# Patient Record
Sex: FEMALE | Race: BLACK OR AFRICAN AMERICAN | NOT HISPANIC OR LATINO | Employment: FULL TIME | URBAN - METROPOLITAN AREA
[De-identification: names, ages, dates, MRNs, and addresses within clinical notes are randomized per-mention and may not be internally consistent; named-entity substitution may affect disease eponyms.]

---

## 2020-01-04 PROBLEM — Z34.80 SUPERVISION OF OTHER NORMAL PREGNANCY: Status: ACTIVE | Noted: 2020-01-04

## 2020-01-04 PROBLEM — Z01.419 WELL WOMAN EXAM: Status: ACTIVE | Noted: 2020-01-04

## 2020-01-08 ENCOUNTER — APPOINTMENT (OUTPATIENT)
Dept: LAB | Facility: HOSPITAL | Age: 30
End: 2020-01-08

## 2020-01-08 ENCOUNTER — INITIAL PRENATAL (OUTPATIENT)
Dept: OBSTETRICS AND GYNECOLOGY | Facility: CLINIC | Age: 30
End: 2020-01-08

## 2020-01-08 VITALS
DIASTOLIC BLOOD PRESSURE: 68 MMHG | BODY MASS INDEX: 20.88 KG/M2 | SYSTOLIC BLOOD PRESSURE: 108 MMHG | WEIGHT: 133 LBS | HEIGHT: 67 IN

## 2020-01-08 DIAGNOSIS — Z34.80 SUPERVISION OF OTHER NORMAL PREGNANCY: Primary | ICD-10-CM

## 2020-01-08 PROBLEM — Z86.19 H/O HERPES GENITALIS: Status: ACTIVE | Noted: 2018-01-01

## 2020-01-08 LAB
2ND TRIMESTER 4 SCREEN SERPL-IMP: NORMAL
ABO GROUP BLD: NORMAL
AMPHET+METHAMPHET UR QL: NEGATIVE
AMPHETAMINES UR QL: NEGATIVE
BARBITURATES UR QL SCN: NEGATIVE
BASOPHILS # BLD AUTO: 0.05 10*3/MM3 (ref 0–0.2)
BASOPHILS NFR BLD AUTO: 0.3 % (ref 0–1.5)
BENZODIAZ UR QL SCN: NEGATIVE
BLD GP AB SCN SERPL QL: NEGATIVE
BUPRENORPHINE SERPL-MCNC: NEGATIVE NG/ML
CANNABINOIDS SERPL QL: NEGATIVE
COCAINE UR QL: NEGATIVE
DEPRECATED RDW RBC AUTO: 42.6 FL (ref 37–54)
EOSINOPHIL # BLD AUTO: 0.33 10*3/MM3 (ref 0–0.4)
EOSINOPHIL NFR BLD AUTO: 2.2 % (ref 0.3–6.2)
ERYTHROCYTE [DISTWIDTH] IN BLOOD BY AUTOMATED COUNT: 12.8 % (ref 12.3–15.4)
EXTERNAL NIPT: NORMAL
HBV SURFACE AG SERPL QL IA: NORMAL
HCT VFR BLD AUTO: 40.2 % (ref 34–46.6)
HCV AB SER DONR QL: NORMAL
HGB BLD-MCNC: 13.7 G/DL (ref 12–15.9)
HIV1+2 AB SER QL: NORMAL
IMM GRANULOCYTES # BLD AUTO: 0.08 10*3/MM3 (ref 0–0.05)
IMM GRANULOCYTES NFR BLD AUTO: 0.5 % (ref 0–0.5)
LYMPHOCYTES # BLD AUTO: 2.58 10*3/MM3 (ref 0.7–3.1)
LYMPHOCYTES NFR BLD AUTO: 17 % (ref 19.6–45.3)
MCH RBC QN AUTO: 30.6 PG (ref 26.6–33)
MCHC RBC AUTO-ENTMCNC: 34.1 G/DL (ref 31.5–35.7)
MCV RBC AUTO: 89.9 FL (ref 79–97)
METHADONE UR QL SCN: NEGATIVE
MONOCYTES # BLD AUTO: 0.68 10*3/MM3 (ref 0.1–0.9)
MONOCYTES NFR BLD AUTO: 4.5 % (ref 5–12)
NEUTROPHILS # BLD AUTO: 11.48 10*3/MM3 (ref 1.7–7)
NEUTROPHILS NFR BLD AUTO: 75.5 % (ref 42.7–76)
NRBC BLD AUTO-RTO: 0 /100 WBC (ref 0–0.2)
OPIATES UR QL: NEGATIVE
OXYCODONE UR QL SCN: NEGATIVE
PCP UR QL SCN: NEGATIVE
PLATELET # BLD AUTO: 285 10*3/MM3 (ref 140–450)
PMV BLD AUTO: 9.3 FL (ref 6–12)
PROPOXYPH UR QL: NEGATIVE
RBC # BLD AUTO: 4.47 10*6/MM3 (ref 3.77–5.28)
RH BLD: POSITIVE
TRICYCLICS UR QL SCN: NEGATIVE
WBC NRBC COR # BLD: 15.2 10*3/MM3 (ref 3.4–10.8)

## 2020-01-08 PROCEDURE — 86850 RBC ANTIBODY SCREEN: CPT | Performed by: OBSTETRICS & GYNECOLOGY

## 2020-01-08 PROCEDURE — 0501F PRENATAL FLOW SHEET: CPT | Performed by: OBSTETRICS & GYNECOLOGY

## 2020-01-08 PROCEDURE — 87591 N.GONORRHOEAE DNA AMP PROB: CPT | Performed by: OBSTETRICS & GYNECOLOGY

## 2020-01-08 PROCEDURE — 80081 OBSTETRIC PANEL INC HIV TSTG: CPT | Performed by: OBSTETRICS & GYNECOLOGY

## 2020-01-08 PROCEDURE — 86900 BLOOD TYPING SEROLOGIC ABO: CPT | Performed by: OBSTETRICS & GYNECOLOGY

## 2020-01-08 PROCEDURE — 87086 URINE CULTURE/COLONY COUNT: CPT | Performed by: OBSTETRICS & GYNECOLOGY

## 2020-01-08 PROCEDURE — 86803 HEPATITIS C AB TEST: CPT | Performed by: OBSTETRICS & GYNECOLOGY

## 2020-01-08 PROCEDURE — 87491 CHLMYD TRACH DNA AMP PROBE: CPT | Performed by: OBSTETRICS & GYNECOLOGY

## 2020-01-08 PROCEDURE — G0432 EIA HIV-1/HIV-2 SCREEN: HCPCS | Performed by: OBSTETRICS & GYNECOLOGY

## 2020-01-08 PROCEDURE — 36415 COLL VENOUS BLD VENIPUNCTURE: CPT | Performed by: OBSTETRICS & GYNECOLOGY

## 2020-01-08 PROCEDURE — 80306 DRUG TEST PRSMV INSTRMNT: CPT | Performed by: OBSTETRICS & GYNECOLOGY

## 2020-01-08 PROCEDURE — 87340 HEPATITIS B SURFACE AG IA: CPT | Performed by: OBSTETRICS & GYNECOLOGY

## 2020-01-08 PROCEDURE — 86901 BLOOD TYPING SEROLOGIC RH(D): CPT | Performed by: OBSTETRICS & GYNECOLOGY

## 2020-01-08 PROCEDURE — 85025 COMPLETE CBC W/AUTO DIFF WBC: CPT | Performed by: OBSTETRICS & GYNECOLOGY

## 2020-01-08 PROCEDURE — 86747 PARVOVIRUS ANTIBODY: CPT | Performed by: OBSTETRICS & GYNECOLOGY

## 2020-01-08 RX ORDER — PRENATAL VIT/IRON FUM/FOLIC AC 27MG-0.8MG
TABLET ORAL DAILY
COMMUNITY

## 2020-01-08 NOTE — PROGRESS NOTES
Subjective   Chief Complaint   Patient presents with   • Initial Prenatal Visit       Lucinda Nieto is a 29 y.o. year old .  Patient's last menstrual period was 10/19/2019 (approximate).  She presents to be seen to initiate prenatal care.     Social History    Tobacco Use      Smoking status: Never Smoker      Smokeless tobacco: Never Used      The following portions of the patient's history were reviewed and updated as appropriate:vital signs, allergies, current medications, past medical history, past social history, past surgical history and problem list.    Objective   Lab Review   No data reviewed    Imaging   No data reviewed    Assessment/Plan   ASSESSMENT  1. 29 y.o. year old  at 11w4d  2. Supervision of low risk pregnancy   3. H/o HSV    PLAN  1. The problem list for pregnancy was initiated today  2. Tests ordered today:  Orders Placed This Encounter   Procedures   • Urine Culture - Urine, Urine, Clean Catch   • Chlamydia trachomatis, Neisseria gonorrhoeae, PCR w/ confirmation - Urine, Urine, Clean Catch   • Obstetric Panel   • HIV-1 / O / 2 Ag / Antibody 4th Generation   • Urine Drug Screen - Urine, Clean Catch     Please confirm all positive UDS   • Parvovirus B19 Antibody, IgG     3. Testing for GC / Chlamydia / trichomonas was done today  4. Genetic testing reviewed: MSAFP-4 and NIPT (Panorama)  5. Herpes suppression at 36 weeks explained  6. Information reviewed: exercise in pregnancy, nutrition in pregnancy, weight gain in pregnancy, work and travel restrictions during pregnancy, list of OTC medications acceptable in pregnancy and call coverage groups    Total time spent today with Lucinda  was 50 minutes (level 4).  Off this time, 80% was spent face-to-face time coordinating care, answering her questions and counseling regarding exercise in pregnancy, nutrition in pregnancy, weight gain in pregnancy, work and travel restrictions during pregnancy, list of OTC medications acceptable in  pregnancy and call coverage groups.    Follow up: 1 week(s)       This note was electronically signed.    Martin Haile MD  January 8, 2020

## 2020-01-09 LAB — RPR SER QL: NORMAL

## 2020-01-10 LAB
B19V IGG SER IA-ACNC: 4.9 INDEX (ref 0–0.8)
BACTERIA SPEC AEROBE CULT: NO GROWTH
RUBV IGG SERPL IA-ACNC: POSITIVE

## 2020-01-13 ENCOUNTER — TELEPHONE (OUTPATIENT)
Dept: OBSTETRICS AND GYNECOLOGY | Facility: CLINIC | Age: 30
End: 2020-01-13

## 2020-01-13 LAB
C TRACH RRNA SPEC DONR QL NAA+PROBE: NEGATIVE
N GONORRHOEA DNA SPEC QL NAA+PROBE: NEGATIVE

## 2020-01-13 NOTE — TELEPHONE ENCOUNTER
Called pt to let her know that per Dr Haile her lab test results showed she previously was exposed to parvovirus.  As such if a child at work comes in with Fifths disease, she is protected and does not have to worry. Pt did not answer so left a message for her to return our call.

## 2020-02-10 ENCOUNTER — ROUTINE PRENATAL (OUTPATIENT)
Dept: OBSTETRICS AND GYNECOLOGY | Facility: CLINIC | Age: 30
End: 2020-02-10

## 2020-02-10 VITALS — BODY MASS INDEX: 24.12 KG/M2 | DIASTOLIC BLOOD PRESSURE: 64 MMHG | WEIGHT: 154 LBS | SYSTOLIC BLOOD PRESSURE: 110 MMHG

## 2020-02-10 DIAGNOSIS — Z12.4 CERVICAL CANCER SCREENING: ICD-10-CM

## 2020-02-10 DIAGNOSIS — Z34.80 SUPERVISION OF OTHER NORMAL PREGNANCY: Primary | ICD-10-CM

## 2020-02-10 PROCEDURE — 0502F SUBSEQUENT PRENATAL CARE: CPT | Performed by: OBSTETRICS & GYNECOLOGY

## 2020-02-10 NOTE — PROGRESS NOTES
Chief Complaint   Patient presents with   • Routine Prenatal Visit       HPI: Lucinda is a  currently at 16w2d who today reports the following:  Nausea - No; Vaginal bleeding -  No; Heartburn - No.    ROS:  GI: Constipation - No; Diarrhea - No    Neuro: Headache - No; Visual change - No      EXAM:  Vitals: See prenatal flowsheet   Abdomen: See prenatal flowsheet   Urine glucose/protein: See prenatal flowsheet   Pelvic: See prenatal flowsheet     Prenatal Labs  Lab Results   Component Value Date    HGB 13.7 2020    RUBELLAABIGG Positive 2020    HEPBSAG Non-Reactive 2020    ABSCRN Negative 2020    EHL9ADI1 Non-Reactive 2020    HEPCVIRUSABY Non-Reactive 2020    URINECX No growth 2020    CHLAMNAA Negative 2020    NGONORRHON Negative 2020       MDM:  Impression: 1. Supervision of low risk pregnancy   Tests done today: 1. PAP   Topics discussed: 1. Continue with PNV's  2. Prenatal labs reviewed  3. flu vaccine   Tests scheduled today for her next visit:   U/S for anatomic screening

## 2020-02-18 ENCOUNTER — TELEPHONE (OUTPATIENT)
Dept: OBSTETRICS AND GYNECOLOGY | Facility: CLINIC | Age: 30
End: 2020-02-18

## 2020-02-18 NOTE — TELEPHONE ENCOUNTER
Dr Haile pt    Pt is calling to say she wants to have the genetic testing that was discussed at her first prenatal visit.

## 2020-02-18 NOTE — TELEPHONE ENCOUNTER
Patient returning call but does not remember the different types of genetic testing so need to discuss.

## 2020-02-18 NOTE — TELEPHONE ENCOUNTER
She does need to swing by the office to  a lab slip.  We talked about a couple different test.  I assume she is talking about and panorama (NIPT) and not the MSAFP-4.  I think we talked about both tests.  Confirm which test she wants done up with the appropriate ordering.  Thank you.

## 2020-03-09 ENCOUNTER — ROUTINE PRENATAL (OUTPATIENT)
Dept: OBSTETRICS AND GYNECOLOGY | Facility: CLINIC | Age: 30
End: 2020-03-09

## 2020-03-09 ENCOUNTER — LAB REQUISITION (OUTPATIENT)
Dept: LAB | Facility: HOSPITAL | Age: 30
End: 2020-03-09

## 2020-03-09 VITALS — SYSTOLIC BLOOD PRESSURE: 108 MMHG | WEIGHT: 160 LBS | DIASTOLIC BLOOD PRESSURE: 64 MMHG | BODY MASS INDEX: 25.06 KG/M2

## 2020-03-09 DIAGNOSIS — Z34.80 SUPERVISION OF OTHER NORMAL PREGNANCY: Primary | ICD-10-CM

## 2020-03-09 DIAGNOSIS — O28.3 ECHOGENIC INTRACARDIAC FOCUS OF FETUS ON PRENATAL ULTRASOUND: ICD-10-CM

## 2020-03-09 DIAGNOSIS — Z00.00 ROUTINE GENERAL MEDICAL EXAMINATION AT A HEALTH CARE FACILITY: ICD-10-CM

## 2020-03-09 PROCEDURE — 0502F SUBSEQUENT PRENATAL CARE: CPT | Performed by: OBSTETRICS & GYNECOLOGY

## 2020-03-09 PROCEDURE — 36415 COLL VENOUS BLD VENIPUNCTURE: CPT

## 2020-03-09 NOTE — PROGRESS NOTES
Chief Complaint   Patient presents with   • Routine Prenatal Visit       HPI: Lucinda is a  currently at 20w2d who today reports the following:  Contractions - No; Leaking - No; Vaginal bleeding -  No; Heartburn - No.    ROS:  GI: Nausea - No ; Constipation - No; Diarrhea - No    Neuro: Headache - No ; Visual change - No      EXAM:  Vitals: See prenatal flowsheet   Abdomen: See prenatal flowsheet   Urine glucose/protein: See prenatal flowsheet   Pelvic: See prenatal flowsheet     Lab Results   Component Value Date    ABO A 2020    RH Positive 2020    ABSCRN Negative 2020       MDM:  Impression: 1. Supervision of low risk pregnancy  2. lveif   Tests done today: 1. U/S for anatomic screening - anatomy completely seen today   Topics discussed: 1. Continue with PNV's  2. Prenatal labs reviewed  3. I explained to uLcinda that an LVEIF is considered a soft marker for Down's syndrome.  As an isolated finding, it may at most double her baseline risk for aneuploidy.  It is not considered a form of congenital heart disease and should have no impact on cardiac function. Additional diagnostic options including NIPT to further quantify risk were discussed. After considering options, they are interested in NIPT.   Tests scheduled today for her next visit:   none

## 2020-03-19 ENCOUNTER — DOCUMENTATION (OUTPATIENT)
Dept: OBSTETRICS AND GYNECOLOGY | Facility: CLINIC | Age: 30
End: 2020-03-19

## 2020-03-27 DIAGNOSIS — Z34.80 SUPERVISION OF OTHER NORMAL PREGNANCY: Primary | ICD-10-CM

## 2020-04-23 ENCOUNTER — ROUTINE PRENATAL (OUTPATIENT)
Dept: OBSTETRICS AND GYNECOLOGY | Facility: CLINIC | Age: 30
End: 2020-04-23

## 2020-04-23 ENCOUNTER — APPOINTMENT (OUTPATIENT)
Dept: LAB | Facility: HOSPITAL | Age: 30
End: 2020-04-23

## 2020-04-23 VITALS — DIASTOLIC BLOOD PRESSURE: 64 MMHG | BODY MASS INDEX: 27.72 KG/M2 | WEIGHT: 177 LBS | SYSTOLIC BLOOD PRESSURE: 110 MMHG

## 2020-04-23 DIAGNOSIS — Z34.80 SUPERVISION OF OTHER NORMAL PREGNANCY: Primary | ICD-10-CM

## 2020-04-23 LAB
EXTERNAL GTT 1 HOUR: 137
GLUCOSE 1H P 100 G GLC PO SERPL-MCNC: 137 MG/DL (ref 65–140)
HCT VFR BLD AUTO: 35.5 % (ref 34–46.6)
HGB BLD-MCNC: 11.9 G/DL (ref 12–15.9)

## 2020-04-23 PROCEDURE — 36415 COLL VENOUS BLD VENIPUNCTURE: CPT | Performed by: OBSTETRICS & GYNECOLOGY

## 2020-04-23 PROCEDURE — 85014 HEMATOCRIT: CPT | Performed by: OBSTETRICS & GYNECOLOGY

## 2020-04-23 PROCEDURE — 0502F SUBSEQUENT PRENATAL CARE: CPT | Performed by: OBSTETRICS & GYNECOLOGY

## 2020-04-23 PROCEDURE — 85018 HEMOGLOBIN: CPT | Performed by: OBSTETRICS & GYNECOLOGY

## 2020-04-23 PROCEDURE — 82950 GLUCOSE TEST: CPT | Performed by: OBSTETRICS & GYNECOLOGY

## 2020-04-24 ENCOUNTER — TELEPHONE (OUTPATIENT)
Dept: OBSTETRICS AND GYNECOLOGY | Facility: CLINIC | Age: 30
End: 2020-04-24

## 2020-04-24 PROBLEM — O99.012 ANEMIA IN PREGNANCY, SECOND TRIMESTER: Status: ACTIVE | Noted: 2020-04-24

## 2020-04-24 RX ORDER — FERROUS SULFATE 325(65) MG
325 TABLET ORAL
Qty: 60 TABLET | Refills: 4 | Status: SHIPPED | OUTPATIENT
Start: 2020-04-24 | End: 2020-09-21

## 2020-04-24 NOTE — TELEPHONE ENCOUNTER
----- Message from Martin Haile MD sent at 4/24/2020  8:45 AM EDT -----  Please call Lucinda regarding her test results.    She is anemic and needs more iron.  A prescription for iron has been called into her pharmacy.  She should start with that once daily for the first week.  So long as constipation is minimal, increase to twice daily and continue through delivery.

## 2020-04-24 NOTE — TELEPHONE ENCOUNTER
Patient advised of results of labs showing anemia.  Her prescription for iron tablets were called in.  She is to take one tablet daily for first week then increase to twice daily.  If she has minimal constipation she will remain on twice daily throughout the remainder of delivery.  If severely constipated to go back to taking once daily.      Patient verbally understood instructions.

## 2020-05-21 ENCOUNTER — ROUTINE PRENATAL (OUTPATIENT)
Dept: OBSTETRICS AND GYNECOLOGY | Facility: CLINIC | Age: 30
End: 2020-05-21

## 2020-05-21 VITALS — BODY MASS INDEX: 28.66 KG/M2 | SYSTOLIC BLOOD PRESSURE: 108 MMHG | DIASTOLIC BLOOD PRESSURE: 66 MMHG | WEIGHT: 183 LBS

## 2020-05-21 DIAGNOSIS — Z34.80 SUPERVISION OF OTHER NORMAL PREGNANCY: Primary | ICD-10-CM

## 2020-05-21 DIAGNOSIS — O99.012 ANEMIA IN PREGNANCY, SECOND TRIMESTER: ICD-10-CM

## 2020-05-21 LAB
GLUCOSE UR STRIP-MCNC: NEGATIVE MG/DL
PROT UR STRIP-MCNC: NEGATIVE MG/DL

## 2020-05-21 PROCEDURE — 99213 OFFICE O/P EST LOW 20 MIN: CPT | Performed by: OBSTETRICS & GYNECOLOGY

## 2020-05-21 NOTE — PROGRESS NOTES
Chief Complaint   Patient presents with   • Routine Prenatal Visit       HPI: Lucinda is a  currently at 30w5d who today reports the following:  Contractions - No; Leaking - No; Vaginal bleeding -  No; Swelling of extremities - No.    ROS:  GI: Nausea - No; Constipation - No; Diarrhea - No    Neuro: Headache - No; Visual change - No    Respiratory: Cough - No; SOB - No; fever - No     EXAM:  Vitals: See prenatal flowsheet   Abdomen: See prenatal flowsheet   Urine glucose/protein: See prenatal flowsheet   Pelvic: See prenatal flowsheet   MDM:   Impression: 1. Supervision of high risk pregnancy  2. Anemia in pregnancy   Tests done today: 1. none   Topics discussed: 1. Continue with PNV's  2. Prenatal labs reviewed  3. Questions answered regarding COVID-19 and revision of office schedule of visits due to pandemic  4. Pediatrician selection   Tests scheduled today for her next visit:   none

## 2020-06-04 ENCOUNTER — TELEMEDICINE (OUTPATIENT)
Dept: OBSTETRICS AND GYNECOLOGY | Facility: CLINIC | Age: 30
End: 2020-06-04

## 2020-06-04 DIAGNOSIS — O99.012 ANEMIA IN PREGNANCY, SECOND TRIMESTER: ICD-10-CM

## 2020-06-04 DIAGNOSIS — Z34.80 SUPERVISION OF OTHER NORMAL PREGNANCY: ICD-10-CM

## 2020-06-04 PROCEDURE — 99213 OFFICE O/P EST LOW 20 MIN: CPT | Performed by: OBSTETRICS & GYNECOLOGY

## 2020-06-04 NOTE — PROGRESS NOTES
Chief Complaint   Patient presents with   • Routine Prenatal Visit       You have chosen to receive care through a telehealth visit.  Do you consent to use a video/audio connection for your medical care today? YES    HPI: Lucinda is a  currently at 32w5d who presents via video-conference reporting the following:  Contractions - No; Leaking - No; Vaginal bleeding -  No; Swelling of extremities - No.    ROS:  GI: Nausea - No; Constipation - No; Diarrhea - No    Neuro: Headache - No; Visual change - No    Respiratory: Cough - No; SOB - No; fever - No     EXAM:  Vitals: See prenatal flowsheet   Abdomen: See prenatal flowsheet   Urine glucose/protein: See prenatal flowsheet   Pelvic: See prenatal flowsheet   MDM:   Impression: 1. Supervision of high risk pregnancy  2. Anemia in pregnancy   Tests done today: 1. none   Topics discussed: 1. Continue with PNV's  2. Prenatal labs reviewed  3. Questions answered regarding COVID-19 and revision of office schedule of visits due to pandemic  4. No additional counseling given - she has no specific complaints or concerns   Tests scheduled today for her next visit:   none     Total time spent today with Lucinda  was 20 minutes (level 3).  Off this time, 100% was spent coordinating care, answering her questions and counseling regarding exercise in pregnancy, nutrition in pregnancy, weight gain in pregnancy, work and travel restrictions during pregnancy, list of OTC medications acceptable in pregnancy and call coverage groups

## 2020-06-19 ENCOUNTER — ROUTINE PRENATAL (OUTPATIENT)
Dept: OBSTETRICS AND GYNECOLOGY | Facility: CLINIC | Age: 30
End: 2020-06-19

## 2020-06-19 VITALS — BODY MASS INDEX: 30.07 KG/M2 | SYSTOLIC BLOOD PRESSURE: 112 MMHG | DIASTOLIC BLOOD PRESSURE: 80 MMHG | WEIGHT: 192 LBS

## 2020-06-19 DIAGNOSIS — Z34.80 SUPERVISION OF OTHER NORMAL PREGNANCY: Primary | ICD-10-CM

## 2020-06-19 PROCEDURE — 99213 OFFICE O/P EST LOW 20 MIN: CPT | Performed by: OBSTETRICS & GYNECOLOGY

## 2020-06-19 RX ORDER — ACYCLOVIR 400 MG/1
400 TABLET ORAL 2 TIMES DAILY
Qty: 60 TABLET | Refills: 1 | Status: SHIPPED | OUTPATIENT
Start: 2020-06-19 | End: 2020-07-03 | Stop reason: HOSPADM

## 2020-06-19 NOTE — PROGRESS NOTES
Chief Complaint   Patient presents with   • Routine Prenatal Visit     no c/o       HPI: Lucinda is a  currently at 34w6d who today reports the following:  Contractions - No; Leaking - No; Vaginal bleeding -  No; Swelling of extremities - No.    ROS:  GI: Nausea - No; Constipation - No; Diarrhea - No    Neuro: Headache - No; Visual change - No    Respiratory: Cough - No; SOB - No; fever - No     EXAM:  Vitals: See prenatal flowsheet   Abdomen: See prenatal flowsheet   Urine glucose/protein: See prenatal flowsheet   Pelvic: See prenatal flowsheet   MDM:   Impression: 1. Supervision of high risk pregnancy  2. Anemia in pregnancy  3. h/o HSV   Tests done today: 1. none   Topics discussed: 1. Continue with PNV's  2. Prenatal labs reviewed  3. Questions answered regarding COVID-19 and revision of office schedule of visits due to pandemic  4. Labor signs and symptoms  5. HSV prophylaxis   Tests scheduled today for her next visit:   none     New Medications Ordered This Visit   Medications   • acyclovir (ZOVIRAX) 400 MG tablet     Sig: Take 1 tablet by mouth 2 (Two) Times a Day for 30 days.     Dispense:  60 tablet     Refill:  1

## 2020-06-26 ENCOUNTER — ROUTINE PRENATAL (OUTPATIENT)
Dept: OBSTETRICS AND GYNECOLOGY | Facility: CLINIC | Age: 30
End: 2020-06-26

## 2020-06-26 ENCOUNTER — RESULTS ENCOUNTER (OUTPATIENT)
Dept: OBSTETRICS AND GYNECOLOGY | Facility: CLINIC | Age: 30
End: 2020-06-26

## 2020-06-26 VITALS — BODY MASS INDEX: 30.45 KG/M2 | DIASTOLIC BLOOD PRESSURE: 68 MMHG | WEIGHT: 194.4 LBS | SYSTOLIC BLOOD PRESSURE: 112 MMHG

## 2020-06-26 DIAGNOSIS — Z36.85 ANTENATAL SCREENING FOR STREPTOCOCCUS B: ICD-10-CM

## 2020-06-26 DIAGNOSIS — O09.93 HIGH-RISK PREGNANCY IN THIRD TRIMESTER: Primary | ICD-10-CM

## 2020-06-26 LAB — GP B STREP RRNA SPEC QL PROBE: NORMAL

## 2020-06-26 PROCEDURE — 99213 OFFICE O/P EST LOW 20 MIN: CPT | Performed by: OBSTETRICS & GYNECOLOGY

## 2020-06-26 NOTE — PROGRESS NOTES
Chief Complaint   Patient presents with   • Routine Prenatal Visit     Mic gallagher GBS today. no c/o       HPI: Lucinda is a  currently at 35w6d who today reports the following:  Contractions - No; Leaking - No; Vaginal bleeding -  No; Swelling of extremities - No.    ROS:  GI: Nausea - No; Constipation - No; Diarrhea - No    Neuro: Headache - No; Visual change - No      EXAM:  Vitals: See prenatal flowsheet   Abdomen: See prenatal flowsheet   Urine glucose/protein: See prenatal flowsheet   Pelvic: See prenatal flowsheet   MDM:   Impression: 1. Supervision of high risk pregnancy  2. Anemia in pregnancy  3. h/o HSV   Tests done today: 1. GBS testing   Topics discussed: 1. Continue with PNV's  2. Prenatal labs reviewed  3.  labor signs and symptoms  4. Symptoms of preeclampsia   Tests scheduled today for her next visit:   none

## 2020-07-01 ENCOUNTER — ANESTHESIA (OUTPATIENT)
Dept: LABOR AND DELIVERY | Facility: HOSPITAL | Age: 30
End: 2020-07-01

## 2020-07-01 ENCOUNTER — TELEPHONE (OUTPATIENT)
Dept: OBSTETRICS AND GYNECOLOGY | Facility: CLINIC | Age: 30
End: 2020-07-01

## 2020-07-01 ENCOUNTER — HOSPITAL ENCOUNTER (INPATIENT)
Facility: HOSPITAL | Age: 30
LOS: 2 days | Discharge: HOME | End: 2020-07-03
Attending: OBSTETRICS & GYNECOLOGY | Admitting: OBSTETRICS & GYNECOLOGY

## 2020-07-01 ENCOUNTER — ANESTHESIA EVENT (OUTPATIENT)
Dept: LABOR AND DELIVERY | Facility: HOSPITAL | Age: 30
End: 2020-07-01

## 2020-07-01 PROBLEM — Z34.80 SUPERVISION OF OTHER NORMAL PREGNANCY: Status: RESOLVED | Noted: 2020-01-04 | Resolved: 2020-07-01

## 2020-07-01 PROBLEM — O42.919 PRETERM PREMATURE RUPTURE OF MEMBRANES (PPROM) WITH UNKNOWN ONSET OF LABOR: Status: ACTIVE | Noted: 2020-07-01

## 2020-07-01 PROBLEM — O42.919 PRETERM PREMATURE RUPTURE OF MEMBRANES (PPROM) WITH UNKNOWN ONSET OF LABOR: Status: RESOLVED | Noted: 2020-07-01 | Resolved: 2020-07-01

## 2020-07-01 LAB
ABO GROUP BLD: NORMAL
ALP SERPL-CCNC: 168 U/L (ref 39–117)
ALT SERPL W P-5'-P-CCNC: 9 U/L (ref 1–33)
AST SERPL-CCNC: 20 U/L (ref 1–32)
ATMOSPHERIC PRESS: ABNORMAL MM[HG]
ATMOSPHERIC PRESS: ABNORMAL MM[HG]
BASE EXCESS BLDCOA CALC-SCNC: -3.6 MMOL/L (ref 0–2)
BASE EXCESS BLDCOV CALC-SCNC: -0.6 MMOL/L (ref 0–2)
BDY SITE: ABNORMAL
BDY SITE: ABNORMAL
BILIRUB SERPL-MCNC: 0.2 MG/DL (ref 0.2–1.2)
BLD GP AB SCN SERPL QL: NEGATIVE
BODY TEMPERATURE: 37 C
BODY TEMPERATURE: 37 C
CO2 BLDA-SCNC: 26.8 MMOL/L (ref 22–33)
CO2 BLDA-SCNC: 28.3 MMOL/L (ref 22–33)
COLLECT TME SMN: ABNORMAL
CREAT SERPL-MCNC: 0.77 MG/DL (ref 0.57–1)
DEPRECATED RDW RBC AUTO: 44.3 FL (ref 37–54)
ERYTHROCYTE [DISTWIDTH] IN BLOOD BY AUTOMATED COUNT: 13.1 % (ref 12.3–15.4)
HCO3 BLDCOA-SCNC: 26.3 MMOL/L (ref 16.9–20.5)
HCO3 BLDCOV-SCNC: 25.4 MMOL/L (ref 18.6–21.4)
HCT VFR BLD AUTO: 39.8 % (ref 34–46.6)
HGB BLD-MCNC: 13.4 G/DL (ref 12–15.9)
HGB BLDA-MCNC: 17 G/DL (ref 14–18)
HGB BLDA-MCNC: 17.9 G/DL (ref 14–18)
INHALED O2 CONCENTRATION: 21 %
INHALED O2 CONCENTRATION: 21 %
LDH SERPL-CCNC: 213 U/L (ref 135–214)
MCH RBC QN AUTO: 31.4 PG (ref 26.6–33)
MCHC RBC AUTO-ENTMCNC: 33.7 G/DL (ref 31.5–35.7)
MCV RBC AUTO: 93.2 FL (ref 79–97)
MODALITY: ABNORMAL
MODALITY: ABNORMAL
NOTE: ABNORMAL
NOTE: ABNORMAL
PCO2 BLDCOA: 65.6 MMHG (ref 43.3–54.9)
PCO2 BLDCOV: 45.2 MM HG
PH BLDCOA: 7.21 PH UNITS (ref 7.22–7.3)
PH BLDCOV: 7.36 PH UNITS
PLATELET # BLD AUTO: 248 10*3/MM3 (ref 140–450)
PMV BLD AUTO: 9.8 FL (ref 6–12)
PO2 BLDCOA: 37.5 MMHG (ref 11.5–43.3)
PO2 BLDCOV: 31.3 MM HG
POC AMNISURE: POSITIVE
RBC # BLD AUTO: 4.27 10*6/MM3 (ref 3.77–5.28)
RH BLD: POSITIVE
SAO2 % BLDCOA: 74.8 %
SAO2 % BLDCOA: ABNORMAL %
SAO2 % BLDCOV: 73 %
T&S EXPIRATION DATE: NORMAL
URATE SERPL-MCNC: 9.1 MG/DL (ref 2.4–5.7)
WBC # BLD AUTO: 15.87 10*3/MM3 (ref 3.4–10.8)

## 2020-07-01 PROCEDURE — 82247 BILIRUBIN TOTAL: CPT | Performed by: OBSTETRICS & GYNECOLOGY

## 2020-07-01 PROCEDURE — 25010000002 ROPIVACAINE PER 1 MG: Performed by: NURSE ANESTHETIST, CERTIFIED REGISTERED

## 2020-07-01 PROCEDURE — 51702 INSERT TEMP BLADDER CATH: CPT

## 2020-07-01 PROCEDURE — 82565 ASSAY OF CREATININE: CPT | Performed by: OBSTETRICS & GYNECOLOGY

## 2020-07-01 PROCEDURE — 59410 OBSTETRICAL CARE: CPT | Performed by: OBSTETRICS & GYNECOLOGY

## 2020-07-01 PROCEDURE — 36415 COLL VENOUS BLD VENIPUNCTURE: CPT | Performed by: OBSTETRICS & GYNECOLOGY

## 2020-07-01 PROCEDURE — 25010000002 PENICILLIN G POTASSIUM PER 600000 UNITS: Performed by: OBSTETRICS & GYNECOLOGY

## 2020-07-01 PROCEDURE — C1755 CATHETER, INTRASPINAL: HCPCS

## 2020-07-01 PROCEDURE — 84450 TRANSFERASE (AST) (SGOT): CPT | Performed by: OBSTETRICS & GYNECOLOGY

## 2020-07-01 PROCEDURE — 0KQM0ZZ REPAIR PERINEUM MUSCLE, OPEN APPROACH: ICD-10-PCS | Performed by: OBSTETRICS & GYNECOLOGY

## 2020-07-01 PROCEDURE — 88307 TISSUE EXAM BY PATHOLOGIST: CPT | Performed by: OBSTETRICS & GYNECOLOGY

## 2020-07-01 PROCEDURE — 82805 BLOOD GASES W/O2 SATURATION: CPT

## 2020-07-01 PROCEDURE — 85027 COMPLETE CBC AUTOMATED: CPT | Performed by: OBSTETRICS & GYNECOLOGY

## 2020-07-01 PROCEDURE — 25010000002 BUTORPHANOL PER 1 MG: Performed by: OBSTETRICS & GYNECOLOGY

## 2020-07-01 PROCEDURE — 84112 EVAL AMNIOTIC FLUID PROTEIN: CPT | Performed by: OBSTETRICS & GYNECOLOGY

## 2020-07-01 PROCEDURE — 84550 ASSAY OF BLOOD/URIC ACID: CPT | Performed by: OBSTETRICS & GYNECOLOGY

## 2020-07-01 PROCEDURE — 86900 BLOOD TYPING SEROLOGIC ABO: CPT | Performed by: OBSTETRICS & GYNECOLOGY

## 2020-07-01 PROCEDURE — 4A0HX4Z MEASUREMENT OF PRODUCTS OF CONCEPTION, CARDIAC ELECTRICAL ACTIVITY, EXTERNAL APPROACH: ICD-10-PCS | Performed by: OBSTETRICS & GYNECOLOGY

## 2020-07-01 PROCEDURE — 83615 LACTATE (LD) (LDH) ENZYME: CPT | Performed by: OBSTETRICS & GYNECOLOGY

## 2020-07-01 PROCEDURE — 84075 ASSAY ALKALINE PHOSPHATASE: CPT | Performed by: OBSTETRICS & GYNECOLOGY

## 2020-07-01 PROCEDURE — 84460 ALANINE AMINO (ALT) (SGPT): CPT | Performed by: OBSTETRICS & GYNECOLOGY

## 2020-07-01 PROCEDURE — 25010000002 FENTANYL CITRATE (PF) 100 MCG/2ML SOLUTION: Performed by: NURSE ANESTHETIST, CERTIFIED REGISTERED

## 2020-07-01 PROCEDURE — 86901 BLOOD TYPING SEROLOGIC RH(D): CPT | Performed by: OBSTETRICS & GYNECOLOGY

## 2020-07-01 PROCEDURE — C1755 CATHETER, INTRASPINAL: HCPCS | Performed by: ANESTHESIOLOGY

## 2020-07-01 PROCEDURE — 86850 RBC ANTIBODY SCREEN: CPT | Performed by: OBSTETRICS & GYNECOLOGY

## 2020-07-01 PROCEDURE — 59025 FETAL NON-STRESS TEST: CPT

## 2020-07-01 RX ORDER — SODIUM CHLORIDE, SODIUM LACTATE, POTASSIUM CHLORIDE, CALCIUM CHLORIDE 600; 310; 30; 20 MG/100ML; MG/100ML; MG/100ML; MG/100ML
125 INJECTION, SOLUTION INTRAVENOUS CONTINUOUS
Status: DISCONTINUED | OUTPATIENT
Start: 2020-07-01 | End: 2020-07-01

## 2020-07-01 RX ORDER — LIDOCAINE HYDROCHLORIDE 10 MG/ML
5 INJECTION, SOLUTION EPIDURAL; INFILTRATION; INTRACAUDAL; PERINEURAL AS NEEDED
Status: DISCONTINUED | OUTPATIENT
Start: 2020-07-01 | End: 2020-07-01 | Stop reason: HOSPADM

## 2020-07-01 RX ORDER — HYDROCORTISONE 25 MG/G
1 CREAM TOPICAL AS NEEDED
Status: DISCONTINUED | OUTPATIENT
Start: 2020-07-01 | End: 2020-07-03 | Stop reason: HOSPADM

## 2020-07-01 RX ORDER — DOCUSATE SODIUM 100 MG/1
100 CAPSULE, LIQUID FILLED ORAL 2 TIMES DAILY
Status: DISCONTINUED | OUTPATIENT
Start: 2020-07-01 | End: 2020-07-03 | Stop reason: HOSPADM

## 2020-07-01 RX ORDER — PROMETHAZINE HYDROCHLORIDE 12.5 MG/1
12.5 TABLET ORAL EVERY 6 HOURS PRN
Status: DISCONTINUED | OUTPATIENT
Start: 2020-07-01 | End: 2020-07-01 | Stop reason: HOSPADM

## 2020-07-01 RX ORDER — ONDANSETRON 2 MG/ML
4 INJECTION INTRAMUSCULAR; INTRAVENOUS ONCE AS NEEDED
Status: DISCONTINUED | OUTPATIENT
Start: 2020-07-01 | End: 2020-07-01 | Stop reason: HOSPADM

## 2020-07-01 RX ORDER — ONDANSETRON 2 MG/ML
4 INJECTION INTRAMUSCULAR; INTRAVENOUS EVERY 6 HOURS PRN
Status: DISCONTINUED | OUTPATIENT
Start: 2020-07-01 | End: 2020-07-01 | Stop reason: HOSPADM

## 2020-07-01 RX ORDER — OXYCODONE HYDROCHLORIDE AND ACETAMINOPHEN 5; 325 MG/1; MG/1
1 TABLET ORAL EVERY 4 HOURS PRN
Status: DISCONTINUED | OUTPATIENT
Start: 2020-07-01 | End: 2020-07-03 | Stop reason: HOSPADM

## 2020-07-01 RX ORDER — OXYTOCIN-SODIUM CHLORIDE 0.9% IV SOLN 30 UNIT/500ML 30-0.9/5 UT/ML-%
650 SOLUTION INTRAVENOUS ONCE
Status: DISCONTINUED | OUTPATIENT
Start: 2020-07-01 | End: 2020-07-01 | Stop reason: HOSPADM

## 2020-07-01 RX ORDER — DIPHENHYDRAMINE HYDROCHLORIDE 50 MG/ML
12.5 INJECTION INTRAMUSCULAR; INTRAVENOUS EVERY 8 HOURS PRN
Status: DISCONTINUED | OUTPATIENT
Start: 2020-07-01 | End: 2020-07-01 | Stop reason: HOSPADM

## 2020-07-01 RX ORDER — PROMETHAZINE HYDROCHLORIDE 12.5 MG/1
12.5 SUPPOSITORY RECTAL EVERY 6 HOURS PRN
Status: DISCONTINUED | OUTPATIENT
Start: 2020-07-01 | End: 2020-07-01 | Stop reason: HOSPADM

## 2020-07-01 RX ORDER — LIDOCAINE HYDROCHLORIDE AND EPINEPHRINE 15; 5 MG/ML; UG/ML
INJECTION, SOLUTION EPIDURAL AS NEEDED
Status: DISCONTINUED | OUTPATIENT
Start: 2020-07-01 | End: 2020-07-01 | Stop reason: SURG

## 2020-07-01 RX ORDER — SODIUM CHLORIDE 0.9 % (FLUSH) 0.9 %
10 SYRINGE (ML) INJECTION AS NEEDED
Status: DISCONTINUED | OUTPATIENT
Start: 2020-07-01 | End: 2020-07-01 | Stop reason: HOSPADM

## 2020-07-01 RX ORDER — MAGNESIUM CARB/ALUMINUM HYDROX 105-160MG
30 TABLET,CHEWABLE ORAL ONCE
Status: DISCONTINUED | OUTPATIENT
Start: 2020-07-01 | End: 2020-07-01 | Stop reason: HOSPADM

## 2020-07-01 RX ORDER — PROMETHAZINE HYDROCHLORIDE 25 MG/ML
12.5 INJECTION, SOLUTION INTRAMUSCULAR; INTRAVENOUS EVERY 6 HOURS PRN
Status: DISCONTINUED | OUTPATIENT
Start: 2020-07-01 | End: 2020-07-01 | Stop reason: HOSPADM

## 2020-07-01 RX ORDER — PENICILLIN G 3000000 [IU]/50ML
3 INJECTION, SOLUTION INTRAVENOUS EVERY 4 HOURS
Status: DISCONTINUED | OUTPATIENT
Start: 2020-07-01 | End: 2020-07-01 | Stop reason: HOSPADM

## 2020-07-01 RX ORDER — ACETAMINOPHEN 325 MG/1
650 TABLET ORAL EVERY 4 HOURS PRN
Status: DISCONTINUED | OUTPATIENT
Start: 2020-07-01 | End: 2020-07-01 | Stop reason: HOSPADM

## 2020-07-01 RX ORDER — EPHEDRINE SULFATE/0.9% NACL/PF 25 MG/5 ML
10 SYRINGE (ML) INTRAVENOUS
Status: DISCONTINUED | OUTPATIENT
Start: 2020-07-01 | End: 2020-07-01 | Stop reason: HOSPADM

## 2020-07-01 RX ORDER — SODIUM CHLORIDE 0.9 % (FLUSH) 0.9 %
3 SYRINGE (ML) INJECTION EVERY 12 HOURS SCHEDULED
Status: DISCONTINUED | OUTPATIENT
Start: 2020-07-01 | End: 2020-07-01 | Stop reason: HOSPADM

## 2020-07-01 RX ORDER — OXYTOCIN-SODIUM CHLORIDE 0.9% IV SOLN 30 UNIT/500ML 30-0.9/5 UT/ML-%
85 SOLUTION INTRAVENOUS ONCE
Status: DISCONTINUED | OUTPATIENT
Start: 2020-07-01 | End: 2020-07-01 | Stop reason: HOSPADM

## 2020-07-01 RX ORDER — IBUPROFEN 600 MG/1
600 TABLET ORAL EVERY 6 HOURS PRN
Status: DISCONTINUED | OUTPATIENT
Start: 2020-07-01 | End: 2020-07-03 | Stop reason: HOSPADM

## 2020-07-01 RX ORDER — BUTORPHANOL TARTRATE 1 MG/ML
1 INJECTION, SOLUTION INTRAMUSCULAR; INTRAVENOUS
Status: DISCONTINUED | OUTPATIENT
Start: 2020-07-01 | End: 2020-07-01

## 2020-07-01 RX ORDER — MORPHINE SULFATE 2 MG/ML
2 INJECTION, SOLUTION INTRAMUSCULAR; INTRAVENOUS
Status: DISCONTINUED | OUTPATIENT
Start: 2020-07-01 | End: 2020-07-01 | Stop reason: HOSPADM

## 2020-07-01 RX ORDER — METOCLOPRAMIDE HYDROCHLORIDE 5 MG/ML
10 INJECTION INTRAMUSCULAR; INTRAVENOUS ONCE AS NEEDED
Status: DISCONTINUED | OUTPATIENT
Start: 2020-07-01 | End: 2020-07-01 | Stop reason: HOSPADM

## 2020-07-01 RX ORDER — ONDANSETRON 4 MG/1
4 TABLET, FILM COATED ORAL EVERY 6 HOURS PRN
Status: DISCONTINUED | OUTPATIENT
Start: 2020-07-01 | End: 2020-07-01 | Stop reason: HOSPADM

## 2020-07-01 RX ORDER — TRISODIUM CITRATE DIHYDRATE AND CITRIC ACID MONOHYDRATE 500; 334 MG/5ML; MG/5ML
30 SOLUTION ORAL ONCE
Status: DISCONTINUED | OUTPATIENT
Start: 2020-07-01 | End: 2020-07-01 | Stop reason: HOSPADM

## 2020-07-01 RX ORDER — LANOLIN
CREAM (ML) TOPICAL
Status: DISCONTINUED | OUTPATIENT
Start: 2020-07-01 | End: 2020-07-03 | Stop reason: HOSPADM

## 2020-07-01 RX ORDER — FAMOTIDINE 10 MG/ML
20 INJECTION, SOLUTION INTRAVENOUS ONCE AS NEEDED
Status: DISCONTINUED | OUTPATIENT
Start: 2020-07-01 | End: 2020-07-01 | Stop reason: HOSPADM

## 2020-07-01 RX ORDER — FENTANYL CITRATE 50 UG/ML
INJECTION, SOLUTION INTRAMUSCULAR; INTRAVENOUS AS NEEDED
Status: DISCONTINUED | OUTPATIENT
Start: 2020-07-01 | End: 2020-07-01 | Stop reason: SURG

## 2020-07-01 RX ORDER — BISACODYL 10 MG
10 SUPPOSITORY, RECTAL RECTAL DAILY PRN
Status: DISCONTINUED | OUTPATIENT
Start: 2020-07-02 | End: 2020-07-03 | Stop reason: HOSPADM

## 2020-07-01 RX ORDER — IBUPROFEN 600 MG/1
600 TABLET ORAL EVERY 6 HOURS PRN
Status: DISCONTINUED | OUTPATIENT
Start: 2020-07-01 | End: 2020-07-01 | Stop reason: HOSPADM

## 2020-07-01 RX ORDER — OXYCODONE HYDROCHLORIDE AND ACETAMINOPHEN 5; 325 MG/1; MG/1
2 TABLET ORAL EVERY 4 HOURS PRN
Status: DISCONTINUED | OUTPATIENT
Start: 2020-07-01 | End: 2020-07-01 | Stop reason: HOSPADM

## 2020-07-01 RX ADMIN — BUTORPHANOL TARTRATE 1 MG: 1 INJECTION, SOLUTION INTRAMUSCULAR; INTRAVENOUS at 03:45

## 2020-07-01 RX ADMIN — SODIUM CHLORIDE, POTASSIUM CHLORIDE, SODIUM LACTATE AND CALCIUM CHLORIDE 125 ML/HR: 600; 310; 30; 20 INJECTION, SOLUTION INTRAVENOUS at 01:20

## 2020-07-01 RX ADMIN — IBUPROFEN 600 MG: 600 TABLET, FILM COATED ORAL at 22:14

## 2020-07-01 RX ADMIN — BUTORPHANOL TARTRATE 2 MG: 2 INJECTION, SOLUTION INTRAMUSCULAR; INTRAVENOUS at 06:34

## 2020-07-01 RX ADMIN — Medication 15 ML/HR: at 09:15

## 2020-07-01 RX ADMIN — PENICILLIN G 3 MILLION UNITS: 3000000 INJECTION, SOLUTION INTRAVENOUS at 06:30

## 2020-07-01 RX ADMIN — PENICILLIN G POTASSIUM 5 MILLION UNITS: 5000000 POWDER, FOR SOLUTION INTRAMUSCULAR; INTRAPLEURAL; INTRATHECAL; INTRAVENOUS at 02:13

## 2020-07-01 RX ADMIN — LIDOCAINE HYDROCHLORIDE AND EPINEPHRINE 2 ML: 15; 5 INJECTION, SOLUTION EPIDURAL at 09:11

## 2020-07-01 RX ADMIN — SODIUM CHLORIDE, POTASSIUM CHLORIDE, SODIUM LACTATE AND CALCIUM CHLORIDE 999 ML/HR: 600; 310; 30; 20 INJECTION, SOLUTION INTRAVENOUS at 06:15

## 2020-07-01 RX ADMIN — PENICILLIN G 3 MILLION UNITS: 3000000 INJECTION, SOLUTION INTRAVENOUS at 11:00

## 2020-07-01 RX ADMIN — ROPIVACAINE HYDROCHLORIDE 10 ML: 5 INJECTION, SOLUTION EPIDURAL; INFILTRATION; PERINEURAL at 09:14

## 2020-07-01 RX ADMIN — BUTORPHANOL TARTRATE 1 MG: 1 INJECTION, SOLUTION INTRAMUSCULAR; INTRAVENOUS at 04:02

## 2020-07-01 RX ADMIN — Medication: at 16:22

## 2020-07-01 RX ADMIN — SODIUM CHLORIDE, POTASSIUM CHLORIDE, SODIUM LACTATE AND CALCIUM CHLORIDE 1000 ML: 600; 310; 30; 20 INJECTION, SOLUTION INTRAVENOUS at 08:40

## 2020-07-01 RX ADMIN — SODIUM CHLORIDE, POTASSIUM CHLORIDE, SODIUM LACTATE AND CALCIUM CHLORIDE 1000 ML: 600; 310; 30; 20 INJECTION, SOLUTION INTRAVENOUS at 09:12

## 2020-07-01 RX ADMIN — SODIUM CHLORIDE, POTASSIUM CHLORIDE, SODIUM LACTATE AND CALCIUM CHLORIDE 125 ML/HR: 600; 310; 30; 20 INJECTION, SOLUTION INTRAVENOUS at 09:45

## 2020-07-01 RX ADMIN — DOCUSATE SODIUM 100 MG: 100 CAPSULE, LIQUID FILLED ORAL at 22:14

## 2020-07-01 RX ADMIN — FENTANYL CITRATE 100 MCG: 50 INJECTION, SOLUTION INTRAMUSCULAR; INTRAVENOUS at 09:11

## 2020-07-01 RX ADMIN — LIDOCAINE HYDROCHLORIDE AND EPINEPHRINE 3 ML: 15; 5 INJECTION, SOLUTION EPIDURAL at 09:08

## 2020-07-01 RX ADMIN — WITCH HAZEL 1 PAD: 500 SOLUTION RECTAL; TOPICAL at 16:21

## 2020-07-01 NOTE — LACTATION NOTE
07/01/20 1645   Maternal Information   Date of Referral 07/01/20   Person Making Referral other (see comments)  (courtesy)   Maternal Infant Feeding   Maternal Emotional State independent   Equipment Type   Breast Pump Type other (see comments)  (Rx given)   Reproductive Interventions   Breast Care: Breastfeeding frequency of feeding adjusted   Breastfeeding Assistance feeding on demand promoted;feeding cue recognition promoted   Breastfeeding Support lactation counseling provided   Coping/Psychosocial Interventions   Parent/Child Attachment Promotion positive reinforcement provided;skin-to-skin contact encouraged;rooming-in promoted   Supportive Measures positive reinforcement provided     Mom states she nursed the baby x 2 and nurse states glucose is stable. Enc to watch for feeding cues. Enc to feed in skin to skin. May need to start pumping in the am. Gave Rx with instructions to get signed and can give pump in am.

## 2020-07-01 NOTE — PLAN OF CARE
Problem: Patient Care Overview  Goal: Plan of Care Review  Outcome: Ongoing (interventions implemented as appropriate)  Flowsheets  Taken 2020 1057  Progress: improving  Outcome Summary: epidural now in place, pt resting  Taken 2020 0860  Plan of Care Reviewed With: patient;significant other  Goal: Individualization and Mutuality  Outcome: Ongoing (interventions implemented as appropriate)  Flowsheets (Taken 2020 1056)  Patient Specific Goals (Include Timeframe): have a healthy baby boy today  Patient Specific Preferences: breatfeeding, epidural     Problem: Labor (Cervical Ripen, Induct, Augment) (Adult,Obstetrics,Pediatric)  Goal: Signs and Symptoms of Listed Potential Problems Will be Absent, Minimized or Managed (Labor)  Outcome: Ongoing (interventions implemented as appropriate)  Flowsheets (Taken 2020 1058)  Problems Assessed (Labor): all  Problems Present (Labor): -- (maternal tachycardia, )

## 2020-07-01 NOTE — PLAN OF CARE
Problem: Breastfeeding (Adult,Obstetrics,Pediatric)  Goal: Signs and Symptoms of Listed Potential Problems Will be Absent, Minimized or Managed (Breastfeeding)  Outcome: Ongoing (interventions implemented as appropriate)  Flowsheets (Taken 7/1/2020 1645)  Problems Assessed (Breastfeeding): all  Problems Present (Breastfeeding): none     Problem: Breastfeeding (Adult,Obstetrics,Pediatric)  Intervention: Promote Breast Care and Comfort  Flowsheets (Taken 7/1/2020 1645)  Breast Care: Breastfeeding: frequency of feeding adjusted  Intervention: Provide Support During Feeding Sessions  Flowsheets (Taken 7/1/2020 1645)  Parent/Child Attachment Promotion: positive reinforcement provided; skin-to-skin contact encouraged; rooming-in promoted  Intervention: Promote Positive Maternal Experience  Flowsheets (Taken 7/1/2020 1645)  Supportive Measures: positive reinforcement provided  Breastfeeding Support: lactation counseling provided  Intervention: Support Exclusive Breastfeeding Success  Flowsheets (Taken 7/1/2020 1645)  Breastfeeding Assistance: feeding on demand promoted; feeding cue recognition promoted

## 2020-07-01 NOTE — L&D DELIVERY NOTE
[facility]  Vaginal Delivery Note    Delivery details     Delivery: Vaginal, Spontaneous     YOB: 2021    Time of Birth: 12:23 PM      Anesthesia: Epidural     Delivering clinician: Shavon Craven    Forceps?   No   Vacuum? No    Shoulder dystocia present: No      Delivery narrative:  Spontaneous vaginal delivery of viable male infant in occiput anterior position. Mouth and nose suctioned with bulb. Infant placed on maternal abdomen. Cord clamped and cut following 60 second delay. Cord gases and blood obtained. Spontaneous delivery of intact placenta. 2nd degree perineal laceration repaired with 3-0 vicryl in usual fashion. Left vaginal laceration repaired with a figure of eight stitch of 3-0 vicryl. EBL: 200 mL.    Infant details    Findings: male  infant     Infant observations: Weight: 4320 g (9 lb 8.4 oz)   Length: 21  in   Apgars: 8   @ 1 minute /    9   @ 5 minutes     Placenta, Cord, and Fluid    Placenta delivered  Spontaneous  at   7/1/2020 12:27 PM     Cord: 3 vessels  present.   Nuchal Cord?  no   Cord blood obtained: Yes      Repair    Episiotomy: None    Lacerations: Yes  Laceration Information  Laceration Repaired?   Perineal: 2nd  Yes    Periurethral:         Labial:         Sulcus:         Vaginal: Yes  Yes    Cervical:           Suture used for repair: 3-0 Vicryl   Estimated Blood Loss: 200 mL     Complications  none    Disposition  Mother to Mother Baby/Postpartum  in stable condition currently.  Baby to remains with mom  in stable condition currently.      Shavon Craven DO  07/01/20  12:57

## 2020-07-01 NOTE — PLAN OF CARE
Problem: Labor (Cervical Ripen, Induct, Augment) (Adult,Obstetrics,Pediatric)  Goal: Signs and Symptoms of Listed Potential Problems Will be Absent, Minimized or Managed (Labor)  2020 1440 by Noa Garza, RN  Outcome: Outcome(s) achieved  Flowsheets  Taken 2020 1058  Problems Assessed (Labor): all  Taken 2020 1440  Problems Present (Labor): -- (prom)  2020 1058 by Noa Garza, RN  Outcome: Ongoing (interventions implemented as appropriate)  Flowsheets (Taken 2020 1058)  Problems Assessed (Labor): all  Problems Present (Labor): -- (maternal tachycardia, )

## 2020-07-01 NOTE — ANESTHESIA PROCEDURE NOTES
Labor Epidural      Patient reassessed immediately prior to procedure    Patient location during procedure: floor  Performed By  Anesthesiologist: Jarad Cox DO  CRNA: Susanna Apple CRNA  Preanesthetic Checklist  Completed: patient identified, surgical consent, pre-op evaluation, timeout performed, IV checked, risks and benefits discussed and monitors and equipment checked  Prep:  Pt Position:sitting  Sterile Tech:cap, gloves, mask and sterile barrier  Prep:DuraPrep  Monitoring:blood pressure monitoring  Epidural Block Procedure:  Approach:midline  Guidance:landmark technique and palpation technique  Location:L4-L5  Needle Type:Tuohy  Needle Gauge:17 G  Loss of Resistance Medium: air  Loss of Resistance: 6cm  Cath Depth at skin:11 cm  Paresthesia: none  Aspiration:negative  Test Dose:negative  Number of Attempts: 1  Post Assessment:  Dressing:occlusive dressing applied and secured with tape  Pt Tolerance:patient tolerated the procedure well with no apparent complications  Complications:no

## 2020-07-01 NOTE — PAYOR COMM NOTE
"Serene Martinez KVNG (30 y.o. Female)   Auth#[id number]  Delivery Information/Clinicals     Date of Birth Social Security Number Address Home Phone MRN    1990  [address] [place] [address] [phone] [id number]    Alevism Marital Status          Unknown Single       Admission Date Admission Type Admitting Provider Attending Provider Department, Room/Bed    20 Elective Martin Hiale MD Youkilis, Bradley B, MD [facility] LABOR DELIVERY, [address]305    Discharge Date Discharge Disposition Discharge Destination                       Attending Provider:  Martin Haile MD    Allergies:  No Known Allergies    Isolation:  None   Infection:  None   Code Status:  CPR    Ht:  170.2 cm (67\")   Wt:  86.2 kg (190 lb)    Admission Cmt:  None   Principal Problem:   premature rupture of membranes (PPROM) with unknown onset of labor [O42.919]                 Active Insurance as of 2020     Primary Coverage     Payor Plan Insurance Group Employer/Plan Group    [place] [id number]     Payor Plan Address Payor Plan Phone Number Payor Plan Fax Number Effective Dates    [employer] Claims Office - [address] [address] [phone]  2020 - None Entered    [place] [address]       Subscriber Name Subscriber Birth Date Member ID       SERENE MARTINEZ KVNG 1990 [id number]                 Emergency Contacts      (Rel.) Home Phone Work Phone Mobile Phone    CAESAR ALMEIDA (Significant Other) [phone] -- --            Insurance Information                [place] Phone: [phone]    Subscriber: Serene Martinez KVNG Subscriber#: [id number]    Group#: [id number] Precert#:           Treatment Team     Provider Relationship Specialty Contact    Martin Haile MD Attending -- [phone]    Noa Garza RN Registered Nurse --           Problem List           Codes Noted - Resolved       Hospital    " "Postpartum care following vaginal delivery ( on 2020 -- BOY -- Emilia) (Chronic) ICD-10-CM: Z39.2  ICD-9-CM: V24.2 2020 - Present       Non-Hospital    Anemia in pregnancy, second trimester ICD-10-CM: O99.012  ICD-9-CM: 648.23 2020 - Present    LVEIF - NIPT normal ICD-10-CM: O28.3  ICD-9-CM: 796.5 3/9/2020 - Present    Annual GYN exam ICD-10-CM: Z01.419  ICD-9-CM: V72.31 2020 - Present    H/O herpes genitalis ICD-10-CM: Z86.19  ICD-9-CM: V12.09 2018 - Present             History & Physical      HerberShavon, DO at 20 0853           Yomaira Nieto  : 1990  MRN: [id number]  CSN: [id number]    History and Physical    Subjective   Leaking fluid    Lucinda Nieto is a 30 y.o. year old  with an Estimated Date of Delivery: 20 currently at 36w4d presenting with leaking fluid.  She also reports irregular contractions.    Prenatal care has been with Dr. Haile.  It has been complicated by History of herpes, LVEIF, anemia.    OB History    Para Term  AB Living   2 0 0 0 1 0   SAB TAB Ectopic Molar Multiple Live Births   0 1 0 0 0 0      # Outcome Date GA Lbr Andrew/2nd Weight Sex Delivery Anes PTL Lv   2 Current            1 TAB 13             Past Medical History:   Diagnosis Date   • H/O herpes genitalis 2018     History reviewed. No pertinent surgical history.    No Known Allergies  Social History    Tobacco Use      Smoking status: Never Smoker      Smokeless tobacco: Never Used    Review of Systems      Objective   /84 (BP Location: Left arm, Patient Position: Lying)   Pulse 115   Temp 98 °F (36.7 °C) (Oral)   Resp 16   Ht 170.2 cm (67\")   Wt 86.2 kg (190 lb)   LMP 10/19/2019 (Approximate)   BMI 29.76 kg/m²    General: well developed; well nourished  no acute distress   Heart: Not performed.   Lungs: breathing is unlabored   Abdomen: soft, non-tender; no masses  no umbilical or inguinal hernias are present  no " hepato-splenomegaly   FHT's: reassuring and category 1   Cervix: was checked (by me): 6 cm / 90 % / -2   Presentation: cephalic   Contractions: regular     Prenatal Labs  Lab Results   Component Value Date    HGB 13.4 07/01/2020    RUBELLAABIGG Positive 01/08/2020    HEPBSAG Non-Reactive 01/08/2020    ABSCRN Negative 07/01/2020    [id number] Non-Reactive 01/08/2020    HEPCVIRUSABY Non-Reactive 01/08/2020     04/23/2020    OQS1LHBJ 137 04/23/2020    URINECX No growth 01/08/2020    CHLAMNAA Negative 01/08/2020    NGONORRHON Negative 01/08/2020       Current Labs Reviewed   CBC and PEP       Assessment   1. IUP at 36w4d  2. Group B strep status: unknown  3. PPROM with onset of labor   4. Elevated Blood Pressure     Plan   1. Expectant management  2. OK for epidural  3. PCN for GBS prophylaxis   4. Will monitor for any signs or symptoms of pre-eclampsia    Shavon Craven DO  7/1/2020  08:54         Electronically signed by Shavon Craven DO at 07/01/20 0856       Vital Signs (last day)     Date/Time   Temp   Temp src   Pulse   Resp   BP   Patient Position   SpO2    07/01/20 1201   --   --   (!) 131   --   --   --   99    07/01/20 1156   --   --   (!) 121   --   --   --   97    07/01/20 1151   --   --   (!) 125   18   125/66   --   97    07/01/20 1146   --   --   (!) 122   --   --   --   97    07/01/20 1141   --   --   117   --   --   --   97    07/01/20 1136   --   --   117   --   --   --   97    07/01/20 1135   --   --   (!) 141   18   108/57   --   --    07/01/20 1131   --   --   (!) 134   --   --   --   98    07/01/20 1126   --   --   110   --   --   --   98    07/01/20 1121   --   --   (!) 122   --   --   --   98    07/01/20 1120   --   --   120   18   118/66   --   --    07/01/20 1116   --   --   114   --   --   --   98    07/01/20 1111   --   --   119   --   --   --   98    07/01/20 1106   --   --   119   16   123/64   --   97    07/01/20 1101   --   --   113   --   --   --   98 07/01/20 1056   --   --    (!) 155   --   --   --   97    07/01/20 1051   --   --   (!) 144   --   --   --   98    07/01/20 1046   --   --   (!) 131   --   --   --   99    07/01/20 1041   --   --   (!) 125   --   --   --   98    07/01/20 1031   --   --   (!) 134   --   --   --   98    07/01/20 1026   --   --   116   --   --   --   98    07/01/20 1022   --   --   112   --   179/69   --   --    07/01/20 1011   --   --   (!) 127   --   --   --   97    07/01/20 1006   --   --   108   --   --   --   99 07/01/20 1005   --   --   (!) 146   --   134/79   --   --    07/01/20 1001   --   --   (!) 121   --   --   --   99    07/01/20 0956   --   --   (!) 131   --   --   --   97 07/01/20 0955   --   --   (!) 137   --   --   --   91    07/01/20 0949   --   --   (!) 127   --   122/68   --   --    07/01/20 0947   --   --   (!) 141   --   120/63   --   --    07/01/20 0944   --   --   101   18   144/65   --   --    07/01/20 0935   --   --   (!) 132   --   124/55   --   97 07/01/20 0930   --   --   (!) 133   --   --   --   97 07/01/20 0925   --   --   (!) 131   16   110/55   --   97    07/01/20 0922   --   --   (!) 141   --   112/56   --   --    07/01/20 0921   98.4 (36.9)   Axillary   --   --   --   --   --    07/01/20 0920   98.4 (36.9)   Oral   (!) 145   18   107/53   --   97 07/01/20 0919   --   --   (!) 141   --   --   --   --    07/01/20 0916   --   --   --   --   126/69   --   --    07/01/20 0915   --   --   (!) 127   --   --   --   96    07/01/20 0913   --   --   (!) 130   --   131/71   --   --    07/01/20 0910   --   --   115   --   132/84   --   97    07/01/20 0908   --   --   (!) 123   --   134/82   --   --    07/01/20 0819   98 (36.7)   Oral   115   16   138/84   Lying   --    07/01/20 0608   98.3 (36.8)   Oral   107   18   --   --   --    07/01/20 0607   98.3 (36.8)   Oral   --   18   --   --   --    07/01/20 0347   98 (36.7)   Oral   106   --   132/79   --   --    07/01/20 0215   98.1 (36.7)   Oral   104   16   132/82   --   --     07/01/20 0040   --   --   --   --   126/91   --   --    07/01/20 0021   98.7 (37.1)   Oral   76   18   --   --   --    07/01/20 0020   --   --   --   --   139/93   --   --                Facility-Administered Medications as of 7/1/2020   Medication Dose Route Frequency Provider Last Rate Last Dose   • acetaminophen (TYLENOL) tablet 650 mg  650 mg Oral Q4H PRN Walt Izquierdo MD       • acetaminophen (TYLENOL) tablet 650 mg  650 mg Oral Q4H PRN Walt Izquierdo MD       • butorphanol (STADOL) injection 1 mg  1 mg Intravenous Q2H PRN Walt Izquierdo MD   1 mg at 07/01/20 0402   • butorphanol (STADOL) injection 2 mg  2 mg Intravenous Q2H PRN Walt Izquierdo MD   2 mg at 07/01/20 0634   • diphenhydrAMINE (BENADRYL) injection 12.5 mg  12.5 mg Intravenous Q8H PRN Susanna Apple CRNA       • ePHEDrine Sulfate 25 MG/5ML syringe 10 mg  10 mg Intravenous Q10 Min PRN Susanna Apple CRNA       • famotidine (PEPCID) injection 20 mg  20 mg Intravenous Once PRN Susanna Apple CRNA       • fentaNYL 1 mcg/mL, Ropivacaine 0.2% in 200mL NS epidural   Epidural Continuous Susanna Apple CRNA 15 mL/hr at 07/01/20 0915 15 mL/hr at 07/01/20 0915   • ibuprofen (ADVIL,MOTRIN) tablet 600 mg  600 mg Oral Q6H PRN Walt Izquierdo MD       • [COMPLETED] lactated ringers bolus 1,000 mL  1,000 mL Intravenous Once Walt Izquierdo MD 4,000 mL/hr at 07/01/20 0840 1,000 mL at 07/01/20 0840   • [COMPLETED] lactated ringers bolus 1,000 mL  1,000 mL Intravenous PRN Susanna Apple CRNA 4,000 mL/hr at 07/01/20 0912 1,000 mL at 07/01/20 0912   • lactated ringers infusion  125 mL/hr Intravenous Continuous Walt Izquierdo  mL/hr at 07/01/20 0945 125 mL/hr at 07/01/20 0945   • lidocaine PF 1% (XYLOCAINE) injection 5 mL  5 mL Intradermal PRN Walt Izquierdo MD       • metoclopramide (REGLAN) injection 10 mg  10 mg Intravenous Once PRN Susanna Apple, CRNA        • mineral oil liquid 30 mL  30 mL Topical Once Walt Izquierdo MD       • morphine injection 2 mg  2 mg Intravenous Q2H PRN Walt Izquierdo MD       • morphine injection 2 mg  2 mg Intravenous Q2H PRN Walt Izquierdo MD       • ondansetron (ZOFRAN) tablet 4 mg  4 mg Oral Q6H PRN Walt Izquierdo MD        Or   • ondansetron (ZOFRAN) injection 4 mg  4 mg Intravenous Q6H PRN Walt Izquierdo MD       • ondansetron (ZOFRAN) injection 4 mg  4 mg Intravenous Once PRN Susanna Apple CRNA       • oxyCODONE-acetaminophen (PERCOCET) 5-325 MG per tablet 2 tablet  2 tablet Oral Q4H PRN Walt Izquierdo MD       • oxytocin in sodium chloride (PITOCIN) 30 UNIT/500ML infusion solution  650 mL/hr Intravenous Once Walt Izquierdo MD        Followed by   • oxytocin in sodium chloride (PITOCIN) 30 UNIT/500ML infusion solution  85 mL/hr Intravenous Once Walt Izquierdo MD       • [COMPLETED] penicillin g 5 mu/100 mL 0.9% NS IVPB (mbp)  5 Million Units Intravenous Once Walt Izquierdo MD   5 Million Units at 07/01/20 0213    Followed by   • penicillin G in iso-osmotic dextrose IVPB 3 million units (premix)  3 Million Units Intravenous Q4H Walt Izquierdo  mL/hr at 07/01/20 1100 3 Million Units at 07/01/20 1100   • promethazine (PHENERGAN) injection 12.5 mg  12.5 mg Intravenous Q6H PRN Walt Izquierdo MD        Or   • promethazine (PHENERGAN) injection 12.5 mg  12.5 mg Intramuscular Q6H PRN Walt Izquierdo MD        Or   • promethazine (PHENERGAN) suppository 12.5 mg  12.5 mg Rectal Q6H PRN Walt Izquierdo MD        Or   • promethazine (PHENERGAN) tablet 12.5 mg  12.5 mg Oral Q6H PRN Walt Izquierdo MD       • promethazine (PHENERGAN) injection 12.5 mg  12.5 mg Intravenous Q6H PRN Walt Izquierdo MD        Or   • promethazine (PHENERGAN) injection 12.5 mg  12.5 mg Intramuscular Q6H PRN Walt Izquierdo MD        Or   • promethazine (PHENERGAN) suppository 12.5 mg  12.5 mg Rectal Q6H PRN Felecia,  MD Walt        Or   • promethazine (PHENERGAN) tablet 12.5 mg  12.5 mg Oral Q6H PRN Walt Izquierdo MD       • Sod Citrate-Citric Acid (BICITRA) solution 30 mL  30 mL Oral Once Susanna Apple CRNA       • sodium chloride 0.9 % flush 10 mL  10 mL Intravenous PRN Walt Izquierdo MD       • sodium chloride 0.9 % flush 3 mL  3 mL Intravenous Q12H Walt Izquierdo MD           Lab Results (last 24 hours)     Procedure Component Value Units Date/Time    Blood Gas, Venous, Cord [[id number]]  (Abnormal) Collected:  07/01/20 1302    Specimen:  Cord Blood Venous from Umbilical Cord Updated:  07/01/20 1302     Site Umbilical     pH, Cord Venous 7.358 pH Units      pCO2, Cord Venous 45.2 mm Hg      pO2, Cord Venous 31.3 mm Hg      HCO3, Cord Venous 25.4 mmol/L      Base Excess, Cord Venous -0.6 mmol/L      O2 Sat, Cord Venous 73.0 %      Hemoglobin, Blood Gas 17.0 g/dL      CO2 Content 26.8 mmol/L      Temperature 37.0 C      Barometric Pressure for Blood Gas --     Comment: N/A        Modality Room Air     Comment: Meter: [id number]     :  [id number]        FIO2 21 %      O2 Saturation Calculated --     Comment: Calculated O2 saturation result not reported at this site.        Note --     Collection Time --    Blood Gas, Arterial, Cord [[id number]]  (Abnormal) Collected:  07/01/20 1300    Specimen:  Cord Blood Arterial from Umbilical Cord Updated:  07/01/20 1301     Site Umbilical     pH, Cord Arterial 7.21 pH Units      pCO2, Cord Arterial 65.6 mmHg      pO2, Cord Arterial 37.5 mmHg      HCO3, Cord Arterial 26.3 mmol/L      Base Exc, Cord Arterial -3.6 mmol/L      O2 Sat, Cord Arterial 74.8 %      Hemoglobin, Blood Gas 17.9 g/dL      CO2 Content 28.3 mmol/L      Temperature 37.0 C      Barometric Pressure for Blood Gas --     Comment: N/A        Modality Room Air     Comment: Meter: [id number]     :  [id number]        FIO2 21 %      Note --    Preeclampsia Panel [[id number]]   (Abnormal) Collected:  07/01/20 0123    Specimen:  Blood Updated:  07/01/20 0155     Alkaline Phosphatase 168 U/L      ALT (SGPT) 9 U/L      AST (SGOT) 20 U/L      Creatinine 0.77 mg/dL      Total Bilirubin 0.2 mg/dL       U/L      Comment: Specimen hemolyzed.  Results may be affected.        Uric Acid 9.1 mg/dL     CBC (No Diff) [[id number]]  (Abnormal) Collected:  07/01/20 0123    Specimen:  Blood Updated:  07/01/20 0142     WBC 15.87 10*3/mm3      RBC 4.27 10*6/mm3      Hemoglobin 13.4 g/dL      Hematocrit 39.8 %      MCV 93.2 fL      MCH 31.4 pg      MCHC 33.7 g/dL      RDW 13.1 %      RDW-SD 44.3 fl      MPV 9.8 fL      Platelets 248 10*3/mm3     GTT 1 Hour [[id number]] Resulted:  04/23/20     Specimen:  Blood Updated:  07/01/20 0003     External GTT 1 Hour 137        Imaging Results (Last 24 Hours)     ** No results found for the last 24 hours. **           Operative/Procedure Notes (last 24 hours) (Notes from 06/30/20 1320 through 07/01/20 1320)      Shavon Craven, DO at 07/01/20 1257          [facility]  Vaginal Delivery Note    Delivery details     Delivery: Vaginal, Spontaneous     YOB: 2021    Time of Birth: 12:23 PM      Anesthesia: Epidural     Delivering clinician: Shavon Craven    Forceps?   No   Vacuum? No    Shoulder dystocia present: No      Delivery narrative:  Spontaneous vaginal delivery of viable male infant in occiput anterior position. Mouth and nose suctioned with bulb. Infant placed on maternal abdomen. Cord clamped and cut following 60 second delay. Cord gases and blood obtained. Spontaneous delivery of intact placenta. 2nd degree perineal laceration repaired with 3-0 vicryl in usual fashion. Left vaginal laceration repaired with a figure of eight stitch of 3-0 vicryl. EBL: 200 mL.    Infant details    Findings: male  infant     Infant observations: Weight: 4320 g (9 lb 8.4 oz)   Length: 21  in   Apgars: 8   @ 1 minute /    9   @ 5 minutes     Placenta,  Cord, and Fluid    Placenta delivered  Spontaneous  at   7/1/2020 12:27 PM     Cord: 3 vessels  present.   Nuchal Cord?  no   Cord blood obtained: Yes      Repair    Episiotomy: None    Lacerations: Yes  Laceration Information  Laceration Repaired?   Perineal: 2nd  Yes    Periurethral:         Labial:         Sulcus:         Vaginal: Yes  Yes    Cervical:           Suture used for repair: 3-0 Vicryl   Estimated Blood Loss: 200 mL     Complications  none    Disposition  Mother to Mother Baby/Postpartum  in stable condition currently.  Baby to remains with mom  in stable condition currently.      Shavon Craven DO  07/01/20  12:57        Electronically signed by Shavon Craven DO at 07/01/20 1300       Physician Progress Notes (last 24 hours) (Notes from 06/30/20 1320 through 07/01/20 1320)    No notes of this type exist for this encounter.         Consult Notes (last 24 hours) (Notes from 06/30/20 1320 through 07/01/20 1320)    No notes of this type exist for this encounter.

## 2020-07-01 NOTE — PLAN OF CARE
Problem: Patient Care Overview  Goal: Plan of Care Review  2020 1441 by Noa Garza, RN  Outcome: Ongoing (interventions implemented as appropriate)  Flowsheets (Taken 2020 1441)  Progress: improving  Outcome Summary: pt with spont. vaginal delivery of viable male.  maternal tachycardia resolving  2020 1058 by Noa Garza, RN  Outcome: Ongoing (interventions implemented as appropriate)  Flowsheets  Taken 2020 1058  Progress: improving  Outcome Summary: epidural now in place, pt resting  Taken 2020 0844  Plan of Care Reviewed With: patient;significant other     Problem: Patient Care Overview  Goal: Individualization and Mutuality  2020 1441 by Noa Garza, RN  Outcome: Ongoing (interventions implemented as appropriate)  Flowsheets (Taken 2020 1441)  Patient Specific Goals (Include Timeframe): delivery of viable male infant.  infant taken to nursery for eval due to   2020 1058 by Noa Garza, RN  Outcome: Ongoing (interventions implemented as appropriate)  Flowsheets (Taken 2020 1058)  Patient Specific Goals (Include Timeframe): have a healthy baby boy today  Patient Specific Preferences: breatfeeding, epidural

## 2020-07-01 NOTE — NURSING NOTE
Pt transferred to mb via wc. Infant taken to nursery for observation.  Bedside report to nursery nurse and mb nurse

## 2020-07-01 NOTE — H&P
"[facility]  Lucinda Nieto  : 1990  MRN: [id number]  CSN: [id number]    History and Physical    Subjective   Leaking fluid    Lucinda Nieto is a 30 y.o. year old  with an Estimated Date of Delivery: 20 currently at 36w4d presenting with leaking fluid.  She also reports irregular contractions.    Prenatal care has been with Dr. Haile.  It has been complicated by History of herpes, LVEIF, anemia.    OB History    Para Term  AB Living   2 0 0 0 1 0   SAB TAB Ectopic Molar Multiple Live Births   0 1 0 0 0 0      # Outcome Date GA Lbr Andrew/2nd Weight Sex Delivery Anes PTL Lv   2 Current            1 TAB 13             Past Medical History:   Diagnosis Date   • H/O herpes genitalis 2018     History reviewed. No pertinent surgical history.    No Known Allergies  Social History    Tobacco Use      Smoking status: Never Smoker      Smokeless tobacco: Never Used    Review of Systems      Objective   /84 (BP Location: Left arm, Patient Position: Lying)   Pulse 115   Temp 98 °F (36.7 °C) (Oral)   Resp 16   Ht 170.2 cm (67\")   Wt 86.2 kg (190 lb)   LMP 10/19/2019 (Approximate)   BMI 29.76 kg/m²   General: well developed; well nourished  no acute distress   Heart: Not performed.   Lungs: breathing is unlabored   Abdomen: soft, non-tender; no masses  no umbilical or inguinal hernias are present  no hepato-splenomegaly   FHT's: reassuring and category 1   Cervix: was checked (by me): 6 cm / 90 % / -2   Presentation: cephalic   Contractions: regular     Prenatal Labs  Lab Results   Component Value Date    HGB 13.4 2020    RUBELLAABIGG Positive 2020    HEPBSAG Non-Reactive 2020    ABSCRN Negative 2020    QFF9UTT5 Non-Reactive 2020    HEPCVIRUSABY Non-Reactive 2020     2020    AWY6WDLI 137 2020    URINECX No growth 2020    CHLAMNAA Negative 2020    NGONORRHON Negative 2020       Current Labs Reviewed "   CBC and PEP       Assessment   1. IUP at 36w4d  2. Group B strep status: unknown  3. PPROM with onset of labor   4. Elevated Blood Pressure     Plan   1. Expectant management  2. OK for epidural  3. PCN for GBS prophylaxis   4. Will monitor for any signs or symptoms of pre-eclampsia    Shavon Craven DO  7/1/2020  08:54

## 2020-07-02 PROBLEM — O28.3 ECHOGENIC INTRACARDIAC FOCUS OF FETUS ON PRENATAL ULTRASOUND: Status: RESOLVED | Noted: 2020-03-09 | Resolved: 2020-07-02

## 2020-07-02 PROBLEM — O99.012 ANEMIA IN PREGNANCY, SECOND TRIMESTER: Status: RESOLVED | Noted: 2020-04-24 | Resolved: 2020-07-02

## 2020-07-02 PROBLEM — Z86.19 H/O HERPES GENITALIS: Status: RESOLVED | Noted: 2018-01-01 | Resolved: 2020-07-02

## 2020-07-02 LAB
ALP SERPL-CCNC: 141 U/L (ref 39–117)
ALT SERPL W P-5'-P-CCNC: 10 U/L (ref 1–33)
AST SERPL-CCNC: 32 U/L (ref 1–32)
BASOPHILS # BLD AUTO: 0.07 10*3/MM3 (ref 0–0.2)
BASOPHILS NFR BLD AUTO: 0.4 % (ref 0–1.5)
BILIRUB SERPL-MCNC: 0.3 MG/DL (ref 0.2–1.2)
CREAT SERPL-MCNC: 0.82 MG/DL (ref 0.57–1)
DEPRECATED RDW RBC AUTO: 46 FL (ref 37–54)
EOSINOPHIL # BLD AUTO: 0.23 10*3/MM3 (ref 0–0.4)
EOSINOPHIL NFR BLD AUTO: 1.3 % (ref 0.3–6.2)
ERYTHROCYTE [DISTWIDTH] IN BLOOD BY AUTOMATED COUNT: 13.3 % (ref 12.3–15.4)
HCT VFR BLD AUTO: 38.1 % (ref 34–46.6)
HGB BLD-MCNC: 12.8 G/DL (ref 12–15.9)
IMM GRANULOCYTES # BLD AUTO: 0.23 10*3/MM3 (ref 0–0.05)
IMM GRANULOCYTES NFR BLD AUTO: 1.3 % (ref 0–0.5)
LDH SERPL-CCNC: 274 U/L (ref 135–214)
LYMPHOCYTES # BLD AUTO: 3.52 10*3/MM3 (ref 0.7–3.1)
LYMPHOCYTES NFR BLD AUTO: 20.5 % (ref 19.6–45.3)
MCH RBC QN AUTO: 32 PG (ref 26.6–33)
MCHC RBC AUTO-ENTMCNC: 33.6 G/DL (ref 31.5–35.7)
MCV RBC AUTO: 95.3 FL (ref 79–97)
MONOCYTES # BLD AUTO: 1.1 10*3/MM3 (ref 0.1–0.9)
MONOCYTES NFR BLD AUTO: 6.4 % (ref 5–12)
NEUTROPHILS NFR BLD AUTO: 11.99 10*3/MM3 (ref 1.7–7)
NEUTROPHILS NFR BLD AUTO: 70.1 % (ref 42.7–76)
NRBC BLD AUTO-RTO: 0 /100 WBC (ref 0–0.2)
PLATELET # BLD AUTO: 194 10*3/MM3 (ref 140–450)
PMV BLD AUTO: 9.6 FL (ref 6–12)
RBC # BLD AUTO: 4 10*6/MM3 (ref 3.77–5.28)
URATE SERPL-MCNC: 9.6 MG/DL (ref 2.4–5.7)
WBC # BLD AUTO: 17.14 10*3/MM3 (ref 3.4–10.8)

## 2020-07-02 PROCEDURE — 82247 BILIRUBIN TOTAL: CPT | Performed by: OBSTETRICS & GYNECOLOGY

## 2020-07-02 PROCEDURE — 84460 ALANINE AMINO (ALT) (SGPT): CPT | Performed by: OBSTETRICS & GYNECOLOGY

## 2020-07-02 PROCEDURE — 99024 POSTOP FOLLOW-UP VISIT: CPT | Performed by: OBSTETRICS & GYNECOLOGY

## 2020-07-02 PROCEDURE — 84450 TRANSFERASE (AST) (SGOT): CPT | Performed by: OBSTETRICS & GYNECOLOGY

## 2020-07-02 PROCEDURE — 83615 LACTATE (LD) (LDH) ENZYME: CPT | Performed by: OBSTETRICS & GYNECOLOGY

## 2020-07-02 PROCEDURE — 85025 COMPLETE CBC W/AUTO DIFF WBC: CPT | Performed by: OBSTETRICS & GYNECOLOGY

## 2020-07-02 PROCEDURE — 84550 ASSAY OF BLOOD/URIC ACID: CPT | Performed by: OBSTETRICS & GYNECOLOGY

## 2020-07-02 PROCEDURE — 84075 ASSAY ALKALINE PHOSPHATASE: CPT | Performed by: OBSTETRICS & GYNECOLOGY

## 2020-07-02 PROCEDURE — 82565 ASSAY OF CREATININE: CPT | Performed by: OBSTETRICS & GYNECOLOGY

## 2020-07-02 RX ADMIN — IBUPROFEN 600 MG: 600 TABLET, FILM COATED ORAL at 12:33

## 2020-07-02 RX ADMIN — IBUPROFEN 600 MG: 600 TABLET, FILM COATED ORAL at 06:26

## 2020-07-02 RX ADMIN — DOCUSATE SODIUM 100 MG: 100 CAPSULE, LIQUID FILLED ORAL at 08:31

## 2020-07-02 RX ADMIN — IBUPROFEN 600 MG: 600 TABLET, FILM COATED ORAL at 18:53

## 2020-07-02 RX ADMIN — DOCUSATE SODIUM 100 MG: 100 CAPSULE, LIQUID FILLED ORAL at 20:47

## 2020-07-02 NOTE — PROGRESS NOTES
[facility]  Lucinda Nieto  : 1990  MRN: [id number]  CSN: [id number]    Postpartum Day #1  Subjective   Her pain is well controlled.  Vaginal bleeding is less than a normal period.       Objective     Min/max vitals past 24 hours:   Temp  Min: 97.3 °F (36.3 °C)  Max: 98.5 °F (36.9 °C)  BP  Min: 107/53  Max: 179/69  Pulse  Min: 91  Max: 200  Resp  Min: 16  Max: 22        Abdomen: soft, non-tender; bowel sounds normal; no masses   fundus firm and non-tender   Pelvic: deferred     Results from last 7 days   Lab Units 20  0632 20  0123   WBC 10*3/mm3 17.14* 15.87*   HEMOGLOBIN g/dL 12.8 13.4   HEMATOCRIT % 38.1 39.8   PLATELETS 10*3/mm3 194 248     Lab Results   Component Value Date    RH Positive 2020    HEPBSAG Non-Reactive 2020        Assessment   1. Postpartum Day #1 S/P vaginal delivery  2. H/o gestational hypertension     Plan   1. Continue routine postpartum care  2. Circumcision was discussed.  It was explained to Lucinda that the procedure is elective.  There are probably no long-term medical benefits for circumcision.  Studies have suggested in the first year of life, there may be a reduction in urinary tract infections in a circumcised male's.  This difference disappears after the first year of life.  Some have suggested that circumcision reduces nerve endings and the tip of the penis which could have an impact to him as he ages.  There are small risks of the procedure including taking off to much or too little skin from the foreskin.  He will be anesthetized and most of the time this is successful.  It cannot be guaranteed that the child will feel no pain.  After hearing this information, she wishes to have her son circumsized.      Shavon Craven DO  2020  08:45

## 2020-07-02 NOTE — PLAN OF CARE
Problem: Patient Care Overview  Goal: Plan of Care Review  Outcome: Ongoing (interventions implemented as appropriate)  Flowsheets  Taken 7/1/2020 1441 by Noa Garza RN  Progress: improving  Taken 7/1/2020 2000 by Ania Frederick, RN  Plan of Care Reviewed With: patient;spouse  Taken 7/2/2020 0431 by Ania Frederick, RN  Outcome Summary: pain controlled with meds, VSS

## 2020-07-02 NOTE — ANESTHESIA POSTPROCEDURE EVALUATION
Patient: Lucinda Nieto    Procedure Summary     Date:  07/01/20 Room / Location:      Anesthesia Start:  0900 Anesthesia Stop:  1227    Procedure:  LABOR ANALGESIA Diagnosis:      Scheduled Providers:   Provider:  Jarad Cox DO    Anesthesia Type:  epidural ASA Status:  2          Anesthesia Type: epidural    Vitals  Vitals Value Taken Time   /61 7/2/2020  7:00 AM   Temp 97.9 °F (36.6 °C) 7/2/2020  7:00 AM   Pulse 91 7/2/2020  7:00 AM   Resp 16 7/2/2020  7:00 AM   SpO2 96 % 7/1/2020  1:45 PM           Post Anesthesia Care and Evaluation    Patient location during evaluation: bedside  Patient participation: complete - patient participated  Level of consciousness: awake and alert  Pain management: adequate  Airway patency: patent  Anesthetic complications: No anesthetic complications    Cardiovascular status: acceptable  Respiratory status: acceptable  Hydration status: acceptable  Post Neuraxial Block status: Motor and sensory function returned to baseline and No signs or symptoms of PDPH

## 2020-07-03 VITALS
OXYGEN SATURATION: 96 % | SYSTOLIC BLOOD PRESSURE: 122 MMHG | WEIGHT: 190 LBS | TEMPERATURE: 97.6 F | DIASTOLIC BLOOD PRESSURE: 73 MMHG | HEIGHT: 67 IN | BODY MASS INDEX: 29.82 KG/M2 | RESPIRATION RATE: 16 BRPM | HEART RATE: 82 BPM

## 2020-07-03 LAB
ALP SERPL-CCNC: 113 U/L (ref 39–117)
ALT SERPL W P-5'-P-CCNC: 12 U/L (ref 1–33)
AST SERPL-CCNC: 29 U/L (ref 1–32)
BILIRUB SERPL-MCNC: 0.2 MG/DL (ref 0.2–1.2)
CREAT SERPL-MCNC: 0.74 MG/DL (ref 0.57–1)
DEPRECATED RDW RBC AUTO: 46.8 FL (ref 37–54)
ERYTHROCYTE [DISTWIDTH] IN BLOOD BY AUTOMATED COUNT: 13.4 % (ref 12.3–15.4)
HCT VFR BLD AUTO: 34.5 % (ref 34–46.6)
HGB BLD-MCNC: 11.5 G/DL (ref 12–15.9)
LDH SERPL-CCNC: 230 U/L (ref 135–214)
MCH RBC QN AUTO: 31.9 PG (ref 26.6–33)
MCHC RBC AUTO-ENTMCNC: 33.3 G/DL (ref 31.5–35.7)
MCV RBC AUTO: 95.6 FL (ref 79–97)
PLATELET # BLD AUTO: 194 10*3/MM3 (ref 140–450)
PMV BLD AUTO: 9.6 FL (ref 6–12)
RBC # BLD AUTO: 3.61 10*6/MM3 (ref 3.77–5.28)
URATE SERPL-MCNC: 8.5 MG/DL (ref 2.4–5.7)
WBC # BLD AUTO: 13.23 10*3/MM3 (ref 3.4–10.8)

## 2020-07-03 PROCEDURE — 99024 POSTOP FOLLOW-UP VISIT: CPT | Performed by: OBSTETRICS & GYNECOLOGY

## 2020-07-03 PROCEDURE — 84450 TRANSFERASE (AST) (SGOT): CPT | Performed by: OBSTETRICS & GYNECOLOGY

## 2020-07-03 PROCEDURE — 82565 ASSAY OF CREATININE: CPT | Performed by: OBSTETRICS & GYNECOLOGY

## 2020-07-03 PROCEDURE — 82247 BILIRUBIN TOTAL: CPT | Performed by: OBSTETRICS & GYNECOLOGY

## 2020-07-03 PROCEDURE — 84075 ASSAY ALKALINE PHOSPHATASE: CPT | Performed by: OBSTETRICS & GYNECOLOGY

## 2020-07-03 PROCEDURE — 85027 COMPLETE CBC AUTOMATED: CPT | Performed by: OBSTETRICS & GYNECOLOGY

## 2020-07-03 PROCEDURE — 84460 ALANINE AMINO (ALT) (SGPT): CPT | Performed by: OBSTETRICS & GYNECOLOGY

## 2020-07-03 PROCEDURE — 83615 LACTATE (LD) (LDH) ENZYME: CPT | Performed by: OBSTETRICS & GYNECOLOGY

## 2020-07-03 PROCEDURE — 84550 ASSAY OF BLOOD/URIC ACID: CPT | Performed by: OBSTETRICS & GYNECOLOGY

## 2020-07-03 RX ORDER — PSEUDOEPHEDRINE HCL 30 MG
100 TABLET ORAL 2 TIMES DAILY
Qty: 60 EACH | Refills: 0 | Status: SHIPPED | OUTPATIENT
Start: 2020-07-03 | End: 2020-08-17

## 2020-07-03 RX ORDER — IBUPROFEN 600 MG/1
600 TABLET ORAL EVERY 6 HOURS PRN
Qty: 40 TABLET | Refills: 0 | Status: SHIPPED | OUTPATIENT
Start: 2020-07-03 | End: 2020-08-17

## 2020-07-03 RX ADMIN — DOCUSATE SODIUM 100 MG: 100 CAPSULE, LIQUID FILLED ORAL at 09:00

## 2020-07-03 NOTE — LACTATION NOTE
07/02/20 1600   Maternal Information   Person Making Referral nurse;patient   Maternal Reason for Referral   (mom has rx signed)   Maternal Assessment   Breast Size Issue none   Breast Shape Bilateral:;round;wide   Nipples Bilateral:;short   Equipment Type   Breast Pump Type double electric, personal  (delivered pump from [employer]; demonstrated use)   Breast Pump Flange Type hard   Breast Pump Flange Size 24 mm   Reproductive Interventions   Breastfeeding Assistance support offered   Breastfeeding Support encouragement provided;lactation counseling provided   Breast Pumping   Breast Pumping Interventions post-feed pumping encouraged   Initiated pumping and mom will pump in a bit. Recommended feeding every 3 hours--breastfeed 10-15 minutes per side/pump/supplement with 15-30 mL neosure or more as ordered by peds. Mom states she is feeling a little overwhelmed. Discussed various ways to manage milk supply--can just pump and supplement until milk comes in. Encouraged as much skin to skin as possible. Reviewed milk supply, importance of breast stimulation every 3 hours if mom wants to make milk, supplementation, breast & nipple care. To call lactation services, if there are questions or concerns or if mom wants help with pumping or feeding.

## 2020-07-03 NOTE — LACTATION NOTE
07/02/20 1135   Maternal Information   Person Making Referral nurse;patient   Maternal Reason for Referral   (mom worried about baby getting enough)   Infant Reason for Referral 35-37 weeks gestation  (baby has had low blood sugars; 9lb 8oz at delivery)   Maternal Assessment   Breast Size Issue none   Breast Shape Bilateral:;round;wide   Breast Density Bilateral:;soft   Nipples Bilateral:;short   Maternal Infant Feeding   Maternal Emotional State assist needed;tense;anxious   Infant Positioning clutch/football   Signs of Milk Transfer infant jaw motion present  (baby latched for a couple minutes at a time)   Pain with Feeding no   Comfort Measures Before/During Feeding infant position adjusted;latch adjusted;maternal position adjusted  (small shield)   Latch Assistance yes   Equipment Type   Breast Pump Type double electric, personal  (has rcx but not signed yet--to get signed soon)   Reproductive Interventions   Breastfeeding Assistance assisted with positioning;feeding session observed;infant latch-on verified;nipple shield utilized;support offered   Breastfeeding Support encouragement provided;diary/feeding log utilized;lactation counseling provided   Breast Pumping   Breast Pumping Interventions post-feed pumping encouraged  (as soon as mom gets pump rx signed)   Helped mom with latch and position--used small nipple shield and formula to help get baby to the breast. Baby has had formula because of low blood sugars. Baby almost 24 hours old and just had first stool and no void yet. Mom states she will get pump rx signed soon so she can get a pump and start pumping. Encouraged as much skin to skin as possible and to attempt to breastfeed every 3 hours before giving formula. Teaching done, as documented under Education. To call for lactation services, if there are questions or concerns or if mom wants help with a breastfeeding.

## 2020-07-03 NOTE — DISCHARGE SUMMARY
Discharge Summary    Date of Admission: 2020  Date of Discharge:  7/3/2020      Patient: Lucinda Nieto      MR#:[id number]    Delivery Provider: Shavon Craven     Discharge Surgeon/OB: Jas Salazar    Presenting Problem/History of Present Illness   premature rupture of membranes (PPROM) with unknown onset of labor [O42.919]     Patient Active Problem List   Diagnosis   • Annual GYN exam   • Postpartum care following  on 2020 -- BOY -- Emilia         Discharge Diagnosis: Vaginal delivery at 36w4d    Procedures:  Vaginal, Spontaneous     2020    12:23 PM        Discharge Date: 7/3/2020;     Hospital Course  Patient is a 30 y.o. female  at 36w4d status post vaginal delivery without complication. Postpartum the patient did well. She remained afebrile, with vital signs stable. She was ready for discharge on postpartum day 2.     Infant:   male  fetus 4320 g (9 lb 8.4 oz)  with Apgar scores of 8  , 9   at five minutes.    Condition on Discharge:  Stable    Vital Signs  Temp:  [97.6 °F (36.4 °C)-98.4 °F (36.9 °C)] 97.6 °F (36.4 °C)  Heart Rate:  [82-99] 82  Resp:  [16-18] 16  BP: (122-136)/(63-73) 122/73    Lab Results   Component Value Date    WBC 13.23 (H) 2020    HGB 11.5 (L) 2020    HCT 34.5 2020    MCV 95.6 2020     2020       Discharge Disposition  Home or Self Care    Discharge Medications     Discharge Medications      New Medications      Instructions Start Date   benzocaine-menthol 20-0.5 % aerosol topical spray  Commonly known as:  DERMOPLAST   Topical, As Needed      docusate sodium 100 MG capsule   100 mg, Oral, 2 Times Daily      ibuprofen 600 MG tablet  Commonly known as:  ADVIL,MOTRIN   600 mg, Oral, Every 6 Hours PRN      witch hazel-glycerin pad  Commonly known as:  TUCKS   1 pad, Topical, As Needed         Continue These Medications      Instructions Start Date   ferrous sulfate 325 (65 FE) MG tablet   325 mg, Oral, 2 Times  Daily Before Meals      prenatal vitamin 27-0.8 27-0.8 MG tablet tablet   Oral, Daily         Stop These Medications    acyclovir 400 MG tablet  Commonly known as:  ZOVIRAX            Discharge Diet: Regular     Activity at Discharge: Routine post partum activity instructions given    Follow-up Appointments  No future appointments.      Jas Salazar MD  07/03/20  10:55  Eastern Time [place]

## 2020-07-06 ENCOUNTER — DOCUMENTATION (OUTPATIENT)
Dept: OBSTETRICS AND GYNECOLOGY | Facility: CLINIC | Age: 30
End: 2020-07-06

## 2020-07-06 LAB
CYTO UR: NORMAL
LAB AP CASE REPORT: NORMAL
LAB AP CLINICAL INFORMATION: NORMAL
PATH REPORT.FINAL DX SPEC: NORMAL
PATH REPORT.GROSS SPEC: NORMAL

## 2020-07-06 NOTE — PAYOR COMM NOTE
"Serene Martinez (30 y.o. Female)   Auth#[id number]  Discharge date     Date of Birth Social Security Number Address Home Phone MRN    [id number]  [address] [place] [address] [address]  [place] [id number] [phone] [id number]    Sikh Marital Status          Unknown Single       Admission Date Admission Type Admitting Provider Attending Provider Department, Room/Bed    20 Elective Martin Haile MD  [facility]HER [facility]AB[facility] 4A, [address]415/    Discharge Date Discharge Disposition Discharge Destination        7/3/2020 Home or Self Care              Attending Provider:  (none)   Allergies:  No Known Allergies    Isolation:  None   Infection:  None   Code Status:  Prior    Ht:  170.2 cm (67\")   Wt:  86.2 kg (190 lb)    Admission Cmt:  None   Principal Problem:  Postpartum care following  on 2020 -- BOY -- Emilia [Z39.2]                 Active Insurance as of 2020     Primary Coverage     Payor Plan Insurance Group Employer/Plan Group    [place] [id number]     Payor Plan Address Payor Plan Phone Number Payor Plan Fax Number Effective Dates    [employer] Claims Office - [address] [address] [phone]  2020 - None Entered    [place] [address]       Subscriber Name Subscriber Birth Date Member ID       SERENE MARTINEZ 1990 [id number]                 Emergency Contacts      (Rel.) Home Phone Work Phone Mobile Phone    ELLECAESAR (Significant Other) [phone] -- --                 Discharge Summary      Jas Salazar MD at 20 1055          Discharge Summary    Date of Admission: 2020  Date of Discharge:  7/3/2020      Patient: Serene Martinez      MR#:[id number]    Delivery Provider: Shavon Craven     Discharge Surgeon/OB: Jsa Salazar    Presenting Problem/History of Present Illness   premature rupture of membranes (PPROM) with unknown onset of labor [O42.919]     Patient Active Problem List   Diagnosis   • " Annual GYN exam   • Postpartum care following  on 2020 -- BOY -- Emilia         Discharge Diagnosis: Vaginal delivery at 36w4d    Procedures:  Vaginal, Spontaneous     2020    12:23 PM        Discharge Date: 7/3/2020;     Hospital Course  Patient is a 30 y.o. female  at 36w4d status post vaginal delivery without complication. Postpartum the patient did well. She remained afebrile, with vital signs stable. She was ready for discharge on postpartum day 2.     Infant:   male  fetus 4320 g (9 lb 8.4 oz)  with Apgar scores of 8  , 9   at five minutes.    Condition on Discharge:  Stable    Vital Signs  Temp:  [97.6 °F (36.4 °C)-98.4 °F (36.9 °C)] 97.6 °F (36.4 °C)  Heart Rate:  [82-99] 82  Resp:  [16-18] 16  BP: (122-136)/(63-73) 122/73    Lab Results   Component Value Date    WBC 13.23 (H) 2020    HGB 11.5 (L) 2020    HCT 34.5 2020    MCV 95.6 2020     2020       Discharge Disposition  Home or Self Care    Discharge Medications     Discharge Medications      New Medications      Instructions Start Date   benzocaine-menthol 20-0.5 % aerosol topical spray  Commonly known as:  DERMOPLAST   Topical, As Needed      docusate sodium 100 MG capsule   100 mg, Oral, 2 Times Daily      ibuprofen 600 MG tablet  Commonly known as:  ADVIL,MOTRIN   600 mg, Oral, Every 6 Hours PRN      witch hazel-glycerin pad  Commonly known as:  TUCKS   1 pad, Topical, As Needed         Continue These Medications      Instructions Start Date   ferrous sulfate 325 (65 FE) MG tablet   325 mg, Oral, 2 Times Daily Before Meals      prenatal vitamin 27-0.8 27-0.8 MG tablet tablet   Oral, Daily         Stop These Medications    acyclovir 400 MG tablet  Commonly known as:  ZOVIRAX            Discharge Diet: Regular     Activity at Discharge: Routine post partum activity instructions given    Follow-up Appointments  No future appointments.      Jas Salazar MD  20  10:55  Eastern Time  Zone          Electronically signed by Jas Salazar MD at 07/03/20 8311

## 2020-08-17 ENCOUNTER — POSTPARTUM VISIT (OUTPATIENT)
Dept: OBSTETRICS AND GYNECOLOGY | Facility: CLINIC | Age: 30
End: 2020-08-17

## 2020-08-17 VITALS
DIASTOLIC BLOOD PRESSURE: 64 MMHG | SYSTOLIC BLOOD PRESSURE: 108 MMHG | RESPIRATION RATE: 14 BRPM | WEIGHT: 157 LBS | BODY MASS INDEX: 24.59 KG/M2

## 2020-08-17 PROCEDURE — 0503F POSTPARTUM CARE VISIT: CPT | Performed by: OBSTETRICS & GYNECOLOGY

## 2020-08-17 RX ORDER — NORGESTIMATE AND ETHINYL ESTRADIOL 7DAYSX3 28
1 KIT ORAL DAILY
Qty: 28 TABLET | Refills: 8 | Status: SHIPPED | OUTPATIENT
Start: 2020-08-17

## 2020-08-17 NOTE — PATIENT INSTRUCTIONS
When initially starting birth control pills, the first pilll should be taken on the Sunday following the onset of your next cycle.  For maximum effectiveness, it should be taken the same time each day.  Because it may take 1 month to become effective, you should use of alternative contraception for the first month.  There is the potential for breakthrough bleeding to occur for up to 4 cycles.     Should you fail to take your birth control on time:    · If you miss a single pill, take it immediately that day.  · If you an entire day, take the pill you missed in addition to the pill you are scheduled to take.  · If you miss 2 consecutive pills, take the extra pill each of the next 2 days.  · If you ever miss 3 consecutive pills, discard the remainder of the pack and start a new pack of pills the Sunday after the next menses begins.  · Should you have any questions about how to manage this, call the office during office hours and we will review this with you  ·

## 2020-08-17 NOTE — PROGRESS NOTES
Subjective   Chief Complaint   Patient presents with   • Postpartum Care     Lucinda Nieto is a 30 y.o. year old  presenting to be seen for her postpartum visit.  She had a vaginal delivery.  Her son is doing well.    Since delivery she has not been sexually active.  She does not have concerns about post-partum blues/depression.   Java Score = 0  She is bottle feeding.  For ongoing contraception, her plans are OCP's.    The following portions of the patient's history were reviewed and updated as appropriate:current medications and allergies    Social History    Tobacco Use      Smoking status: Never Smoker      Smokeless tobacco: Never Used      Review of Systems  Constitutional POS: nothing reported    NEG: anorexia or night sweats   Genitourinary POS: nothing reported    NEG: dysuria or hematuria      Gastointestinal POS: nothing reported    NEG: bloating, change in bowel habits, melena or reflux symptoms   Breast POS: nothing reported    NEG: persistent breast lump, skin dimpling or nipple discharge        Objective   /64   Resp 14   Wt 71.2 kg (157 lb)   LMP 10/19/2019 (Approximate)   Breastfeeding No   BMI 24.59 kg/m²     General:  well developed; well nourished  no acute distress   Abdomen: soft, non-tender; no masses  no umbilical or inguinal hernias are present  no hepato-splenomegaly   Pelvis: Clinical staff was present for exam  External genitalia:  normal appearance of the external genitalia including Bartholin's and [place]'s glands.  :  urethral meatus normal;  Vaginal:  normal pink mucosa without prolapse or lesions.  Cervix:  normal appearance.  Uterus:  normal size, shape and consistency. fully involuted  Adnexa:  normal bimanual exam of the adnexa.  Rectal:  digital rectal exam not performed; anus visually normal appearing.          Assessment   1. Normal 6 week postpartum exam S/P vaginal delivery     Plan   1. BC options reviewed and compared today: OCP  (estrogen/progesterone)   2. She was instructed how to start her birth control.  It should be started on Sunday following the onset of her next cycle.  Because it may take 1 month to become effective, the use of alternative contraception for one month was stressed.  The potential for breakthrough bleeding for up to 4 cycles was also emphasized.  3. The importance of keeping all planned follow-up and taking all medications as prescribed was emphasized.  4. Follow up for annual exam 4/2021     New Medications Ordered This Visit   Medications   • norgestimate-ethinyl estradiol (Ortho Tri-Cyclen, 28,) 0.18/0.215/0.25 MG-35 MCG per tablet     Sig: Take 1 tablet by mouth Daily.     Dispense:  28 tablet     Refill:  8          This note was electronically signed.    Martin Haile M.D.  August 17, 2020    Note: Speech recognition transcription software may have been used to create portions of this document.  An attempt at proofreading has been made but errors in transcription could still be present.
